# Patient Record
Sex: FEMALE | Race: WHITE | HISPANIC OR LATINO | URBAN - METROPOLITAN AREA
[De-identification: names, ages, dates, MRNs, and addresses within clinical notes are randomized per-mention and may not be internally consistent; named-entity substitution may affect disease eponyms.]

---

## 2017-10-25 ENCOUNTER — APPOINTMENT (OUTPATIENT)
Dept: RADIOLOGY | Facility: MEDICAL CENTER | Age: 52
DRG: 956 | End: 2017-10-25
Attending: EMERGENCY MEDICINE
Payer: COMMERCIAL

## 2017-10-25 ENCOUNTER — RESOLUTE PROFESSIONAL BILLING HOSPITAL PROF FEE (OUTPATIENT)
Dept: HOSPITALIST | Facility: MEDICAL CENTER | Age: 52
End: 2017-10-25

## 2017-10-25 ENCOUNTER — APPOINTMENT (OUTPATIENT)
Dept: RADIOLOGY | Facility: MEDICAL CENTER | Age: 52
DRG: 956 | End: 2017-10-25
Attending: PHYSICIAN ASSISTANT
Payer: COMMERCIAL

## 2017-10-25 ENCOUNTER — APPOINTMENT (OUTPATIENT)
Dept: RADIOLOGY | Facility: MEDICAL CENTER | Age: 52
DRG: 956 | End: 2017-10-25
Attending: SURGERY
Payer: COMMERCIAL

## 2017-10-25 ENCOUNTER — HOSPITAL ENCOUNTER (INPATIENT)
Facility: MEDICAL CENTER | Age: 52
LOS: 14 days | DRG: 956 | End: 2017-11-08
Attending: EMERGENCY MEDICINE | Admitting: SURGERY
Payer: COMMERCIAL

## 2017-10-25 DIAGNOSIS — S36.039A LACERATION OF SPLEEN, INITIAL ENCOUNTER: ICD-10-CM

## 2017-10-25 DIAGNOSIS — E11.8 TYPE 2 DIABETES MELLITUS WITH COMPLICATION, UNSPECIFIED LONG TERM INSULIN USE STATUS: ICD-10-CM

## 2017-10-25 DIAGNOSIS — S37.29XA TRAUMATIC RUPTURE OF BLADDER, INITIAL ENCOUNTER: ICD-10-CM

## 2017-10-25 DIAGNOSIS — S32.810A MULTIPLE CLOSED FRACTURES OF PELVIS WITH STABLE DISRUPTION OF PELVIC RING, INITIAL ENCOUNTER (HCC): ICD-10-CM

## 2017-10-25 DIAGNOSIS — S72.91XA CLOSED FRACTURE OF RIGHT FEMUR, UNSPECIFIED FRACTURE MORPHOLOGY, UNSPECIFIED PORTION OF FEMUR, INITIAL ENCOUNTER (HCC): ICD-10-CM

## 2017-10-25 DIAGNOSIS — S82.65XA CLOSED NONDISPLACED FRACTURE OF LATERAL MALLEOLUS OF LEFT FIBULA, INITIAL ENCOUNTER: ICD-10-CM

## 2017-10-25 PROBLEM — E11.9 TYPE 2 DIABETES MELLITUS (HCC): Status: ACTIVE | Noted: 2017-10-25

## 2017-10-25 PROBLEM — N32.89 BLADDER RUPTURE: Status: ACTIVE | Noted: 2017-10-25

## 2017-10-25 PROBLEM — S72.90XA FEMUR FRACTURE (HCC): Status: ACTIVE | Noted: 2017-10-25

## 2017-10-25 PROBLEM — S32.82XA MULTIPLE FRACTURES OF PELVIS (HCC): Status: ACTIVE | Noted: 2017-10-25

## 2017-10-25 PROBLEM — I10 HTN (HYPERTENSION): Status: ACTIVE | Noted: 2017-10-25

## 2017-10-25 PROBLEM — S32.9XXA PELVIC FRACTURE (HCC): Status: ACTIVE | Noted: 2017-10-25

## 2017-10-25 LAB
ABO GROUP BLD: NORMAL
ABO GROUP BLD: NORMAL
ALBUMIN SERPL BCP-MCNC: 3.6 G/DL (ref 3.2–4.9)
ALBUMIN/GLOB SERPL: 1.3 G/DL
ALP SERPL-CCNC: 64 U/L (ref 30–99)
ALT SERPL-CCNC: 20 U/L (ref 2–50)
ANION GAP SERPL CALC-SCNC: 9 MMOL/L (ref 0–11.9)
APTT PPP: 22.4 SEC (ref 24.7–36)
AST SERPL-CCNC: 37 U/L (ref 12–45)
BILIRUB SERPL-MCNC: 0.5 MG/DL (ref 0.1–1.5)
BLD GP AB SCN SERPL QL: NORMAL
BUN SERPL-MCNC: 15 MG/DL (ref 8–22)
CALCIUM SERPL-MCNC: 8.5 MG/DL (ref 8.5–10.5)
CHLORIDE SERPL-SCNC: 101 MMOL/L (ref 96–112)
CO2 SERPL-SCNC: 24 MMOL/L (ref 20–33)
CREAT SERPL-MCNC: 0.7 MG/DL (ref 0.5–1.4)
ERYTHROCYTE [DISTWIDTH] IN BLOOD BY AUTOMATED COUNT: 42 FL (ref 35.9–50)
ETHANOL BLD-MCNC: 0 G/DL
GFR SERPL CREATININE-BSD FRML MDRD: >60 ML/MIN/1.73 M 2
GLOBULIN SER CALC-MCNC: 2.7 G/DL (ref 1.9–3.5)
GLUCOSE BLD-MCNC: 201 MG/DL (ref 65–99)
GLUCOSE BLD-MCNC: 246 MG/DL (ref 65–99)
GLUCOSE SERPL-MCNC: 206 MG/DL (ref 65–99)
HCG SERPL QL: NEGATIVE
HCT VFR BLD AUTO: 38.5 % (ref 37–47)
HGB BLD-MCNC: 13 G/DL (ref 12–16)
HGB BLD-MCNC: 7.9 G/DL (ref 12–16)
HGB BLD-MCNC: 9.7 G/DL (ref 12–16)
INR PPP: 1.03 (ref 0.87–1.13)
MCH RBC QN AUTO: 30.8 PG (ref 27–33)
MCHC RBC AUTO-ENTMCNC: 33.8 G/DL (ref 33.6–35)
MCV RBC AUTO: 91.2 FL (ref 81.4–97.8)
PLATELET # BLD AUTO: 347 K/UL (ref 164–446)
PMV BLD AUTO: 8.8 FL (ref 9–12.9)
POTASSIUM SERPL-SCNC: 3.8 MMOL/L (ref 3.6–5.5)
PROT SERPL-MCNC: 6.3 G/DL (ref 6–8.2)
PROTHROMBIN TIME: 13.8 SEC (ref 12–14.6)
RBC # BLD AUTO: 4.22 M/UL (ref 4.2–5.4)
RH BLD: NORMAL
SODIUM SERPL-SCNC: 134 MMOL/L (ref 135–145)
WBC # BLD AUTO: 12.9 K/UL (ref 4.8–10.8)

## 2017-10-25 PROCEDURE — 700111 HCHG RX REV CODE 636 W/ 250 OVERRIDE (IP): Performed by: PHYSICIAN ASSISTANT

## 2017-10-25 PROCEDURE — 700102 HCHG RX REV CODE 250 W/ 637 OVERRIDE(OP): Performed by: SURGERY

## 2017-10-25 PROCEDURE — 90715 TDAP VACCINE 7 YRS/> IM: CPT | Performed by: EMERGENCY MEDICINE

## 2017-10-25 PROCEDURE — 36430 TRANSFUSION BLD/BLD COMPNT: CPT

## 2017-10-25 PROCEDURE — 700105 HCHG RX REV CODE 258: Performed by: SURGERY

## 2017-10-25 PROCEDURE — 86901 BLOOD TYPING SEROLOGIC RH(D): CPT

## 2017-10-25 PROCEDURE — 86923 COMPATIBILITY TEST ELECTRIC: CPT

## 2017-10-25 PROCEDURE — 770022 HCHG ROOM/CARE - ICU (200)

## 2017-10-25 PROCEDURE — 82962 GLUCOSE BLOOD TEST: CPT | Mod: 91

## 2017-10-25 PROCEDURE — P9016 RBC LEUKOCYTES REDUCED: HCPCS

## 2017-10-25 PROCEDURE — 700111 HCHG RX REV CODE 636 W/ 250 OVERRIDE (IP): Performed by: SURGERY

## 2017-10-25 PROCEDURE — 71010 DX-CHEST-LIMITED (1 VIEW): CPT

## 2017-10-25 PROCEDURE — G0390 TRAUMA RESPONS W/HOSP CRITI: HCPCS

## 2017-10-25 PROCEDURE — 73560 X-RAY EXAM OF KNEE 1 OR 2: CPT | Mod: RT

## 2017-10-25 PROCEDURE — 0TQB0ZZ REPAIR BLADDER, OPEN APPROACH: ICD-10-PCS | Performed by: SURGERY

## 2017-10-25 PROCEDURE — 80053 COMPREHEN METABOLIC PANEL: CPT

## 2017-10-25 PROCEDURE — 51702 INSERT TEMP BLADDER CATH: CPT

## 2017-10-25 PROCEDURE — 86850 RBC ANTIBODY SCREEN: CPT

## 2017-10-25 PROCEDURE — 160035 HCHG PACU - 1ST 60 MINS PHASE I: Performed by: SURGERY

## 2017-10-25 PROCEDURE — A9270 NON-COVERED ITEM OR SERVICE: HCPCS

## 2017-10-25 PROCEDURE — 72128 CT CHEST SPINE W/O DYE: CPT

## 2017-10-25 PROCEDURE — 160041 HCHG SURGERY MINUTES - EA ADDL 1 MIN LEVEL 4: Performed by: SURGERY

## 2017-10-25 PROCEDURE — 700117 HCHG RX CONTRAST REV CODE 255: Performed by: EMERGENCY MEDICINE

## 2017-10-25 PROCEDURE — 71260 CT THORAX DX C+: CPT

## 2017-10-25 PROCEDURE — 160029 HCHG SURGERY MINUTES - 1ST 30 MINS LEVEL 4: Performed by: SURGERY

## 2017-10-25 PROCEDURE — 700111 HCHG RX REV CODE 636 W/ 250 OVERRIDE (IP)

## 2017-10-25 PROCEDURE — 500122 HCHG BOVIE, BLADE: Performed by: SURGERY

## 2017-10-25 PROCEDURE — 72131 CT LUMBAR SPINE W/O DYE: CPT

## 2017-10-25 PROCEDURE — 85610 PROTHROMBIN TIME: CPT

## 2017-10-25 PROCEDURE — 501445 HCHG STAPLER, SKIN DISP: Performed by: SURGERY

## 2017-10-25 PROCEDURE — 73552 X-RAY EXAM OF FEMUR 2/>: CPT | Mod: RT

## 2017-10-25 PROCEDURE — 99291 CRITICAL CARE FIRST HOUR: CPT

## 2017-10-25 PROCEDURE — 86900 BLOOD TYPING SEROLOGIC ABO: CPT

## 2017-10-25 PROCEDURE — 85018 HEMOGLOBIN: CPT

## 2017-10-25 PROCEDURE — 72125 CT NECK SPINE W/O DYE: CPT

## 2017-10-25 PROCEDURE — 73590 X-RAY EXAM OF LOWER LEG: CPT | Mod: LT

## 2017-10-25 PROCEDURE — 160036 HCHG PACU - EA ADDL 30 MINS PHASE I: Performed by: SURGERY

## 2017-10-25 PROCEDURE — 700101 HCHG RX REV CODE 250

## 2017-10-25 PROCEDURE — 90471 IMMUNIZATION ADMIN: CPT

## 2017-10-25 PROCEDURE — 73610 X-RAY EXAM OF ANKLE: CPT | Mod: LT

## 2017-10-25 PROCEDURE — 96375 TX/PRO/DX INJ NEW DRUG ADDON: CPT

## 2017-10-25 PROCEDURE — 160002 HCHG RECOVERY MINUTES (STAT): Performed by: SURGERY

## 2017-10-25 PROCEDURE — 700102 HCHG RX REV CODE 250 W/ 637 OVERRIDE(OP)

## 2017-10-25 PROCEDURE — 303105 HCHG CATHETER EXTRA

## 2017-10-25 PROCEDURE — 160048 HCHG OR STATISTICAL LEVEL 1-5: Performed by: SURGERY

## 2017-10-25 PROCEDURE — 80307 DRUG TEST PRSMV CHEM ANLYZR: CPT

## 2017-10-25 PROCEDURE — 70450 CT HEAD/BRAIN W/O DYE: CPT

## 2017-10-25 PROCEDURE — 501838 HCHG SUTURE GENERAL: Performed by: SURGERY

## 2017-10-25 PROCEDURE — 85730 THROMBOPLASTIN TIME PARTIAL: CPT

## 2017-10-25 PROCEDURE — 85027 COMPLETE CBC AUTOMATED: CPT

## 2017-10-25 PROCEDURE — 160009 HCHG ANES TIME/MIN: Performed by: SURGERY

## 2017-10-25 PROCEDURE — 96365 THER/PROPH/DIAG IV INF INIT: CPT

## 2017-10-25 PROCEDURE — 72170 X-RAY EXAM OF PELVIS: CPT

## 2017-10-25 PROCEDURE — 700111 HCHG RX REV CODE 636 W/ 250 OVERRIDE (IP): Performed by: EMERGENCY MEDICINE

## 2017-10-25 PROCEDURE — 84703 CHORIONIC GONADOTROPIN ASSAY: CPT

## 2017-10-25 RX ORDER — SODIUM CHLORIDE, SODIUM LACTATE, POTASSIUM CHLORIDE, CALCIUM CHLORIDE 600; 310; 30; 20 MG/100ML; MG/100ML; MG/100ML; MG/100ML
2000 INJECTION, SOLUTION INTRAVENOUS ONCE
Status: COMPLETED | OUTPATIENT
Start: 2017-10-25 | End: 2017-10-25

## 2017-10-25 RX ORDER — SODIUM CHLORIDE, SODIUM LACTATE, POTASSIUM CHLORIDE, CALCIUM CHLORIDE 600; 310; 30; 20 MG/100ML; MG/100ML; MG/100ML; MG/100ML
INJECTION, SOLUTION INTRAVENOUS CONTINUOUS
Status: DISCONTINUED | OUTPATIENT
Start: 2017-10-25 | End: 2017-10-30

## 2017-10-25 RX ORDER — LEVOTHYROXINE SODIUM 0.07 MG/1
75 TABLET ORAL
COMMUNITY

## 2017-10-25 RX ORDER — LISINOPRIL 5 MG/1
5 TABLET ORAL DAILY
Status: ON HOLD | COMMUNITY
End: 2017-10-30

## 2017-10-25 RX ORDER — MAGNESIUM HYDROXIDE 1200 MG/15ML
LIQUID ORAL
Status: DISCONTINUED | OUTPATIENT
Start: 2017-10-25 | End: 2017-10-25 | Stop reason: HOSPADM

## 2017-10-25 RX ORDER — POLYETHYLENE GLYCOL 3350 17 G/17G
1 POWDER, FOR SOLUTION ORAL 2 TIMES DAILY
Status: DISCONTINUED | OUTPATIENT
Start: 2017-10-25 | End: 2017-11-08 | Stop reason: HOSPADM

## 2017-10-25 RX ORDER — DOCUSATE SODIUM 100 MG/1
100 CAPSULE, LIQUID FILLED ORAL 2 TIMES DAILY
Status: DISCONTINUED | OUTPATIENT
Start: 2017-10-25 | End: 2017-11-08 | Stop reason: HOSPADM

## 2017-10-25 RX ORDER — SIMVASTATIN 20 MG
20 TABLET ORAL DAILY
COMMUNITY

## 2017-10-25 RX ORDER — AMOXICILLIN 250 MG
1 CAPSULE ORAL
Status: DISCONTINUED | OUTPATIENT
Start: 2017-10-25 | End: 2017-11-08 | Stop reason: HOSPADM

## 2017-10-25 RX ORDER — CHLORHEXIDINE GLUCONATE ORAL RINSE 1.2 MG/ML
15 SOLUTION DENTAL EVERY 12 HOURS
Status: DISCONTINUED | OUTPATIENT
Start: 2017-10-25 | End: 2017-10-25

## 2017-10-25 RX ORDER — METFORMIN HYDROCHLORIDE 750 MG/1
1000 TABLET, EXTENDED RELEASE ORAL 2 TIMES DAILY
COMMUNITY

## 2017-10-25 RX ORDER — ENEMA 19; 7 G/133ML; G/133ML
1 ENEMA RECTAL
Status: DISCONTINUED | OUTPATIENT
Start: 2017-10-25 | End: 2017-11-08 | Stop reason: HOSPADM

## 2017-10-25 RX ORDER — ONDANSETRON 2 MG/ML
4 INJECTION INTRAMUSCULAR; INTRAVENOUS EVERY 4 HOURS PRN
Status: DISCONTINUED | OUTPATIENT
Start: 2017-10-25 | End: 2017-11-08 | Stop reason: HOSPADM

## 2017-10-25 RX ORDER — AMOXICILLIN 250 MG
1 CAPSULE ORAL NIGHTLY
Status: DISCONTINUED | OUTPATIENT
Start: 2017-10-25 | End: 2017-11-08 | Stop reason: HOSPADM

## 2017-10-25 RX ORDER — DEXTROSE MONOHYDRATE 25 G/50ML
25 INJECTION, SOLUTION INTRAVENOUS
Status: DISCONTINUED | OUTPATIENT
Start: 2017-10-25 | End: 2017-11-08 | Stop reason: HOSPADM

## 2017-10-25 RX ORDER — HYDROMORPHONE HYDROCHLORIDE 2 MG/ML
0.5 INJECTION, SOLUTION INTRAMUSCULAR; INTRAVENOUS; SUBCUTANEOUS
Status: COMPLETED | OUTPATIENT
Start: 2017-10-25 | End: 2017-10-25

## 2017-10-25 RX ORDER — BISACODYL 10 MG
10 SUPPOSITORY, RECTAL RECTAL
Status: DISCONTINUED | OUTPATIENT
Start: 2017-10-25 | End: 2017-11-08 | Stop reason: HOSPADM

## 2017-10-25 RX ADMIN — FENTANYL CITRATE 100 MCG: 50 INJECTION, SOLUTION INTRAMUSCULAR; INTRAVENOUS at 11:00

## 2017-10-25 RX ADMIN — SODIUM CHLORIDE, POTASSIUM CHLORIDE, SODIUM LACTATE AND CALCIUM CHLORIDE: 600; 310; 30; 20 INJECTION, SOLUTION INTRAVENOUS at 17:18

## 2017-10-25 RX ADMIN — SODIUM CHLORIDE, POTASSIUM CHLORIDE, SODIUM LACTATE AND CALCIUM CHLORIDE: 600; 310; 30; 20 INJECTION, SOLUTION INTRAVENOUS at 12:08

## 2017-10-25 RX ADMIN — CEFAZOLIN SODIUM 2 G: 1 INJECTION, SOLUTION INTRAVENOUS at 10:57

## 2017-10-25 RX ADMIN — CEFAZOLIN SODIUM: 1 INJECTION, SOLUTION INTRAVENOUS at 11:00

## 2017-10-25 RX ADMIN — Medication: at 12:13

## 2017-10-25 RX ADMIN — HYDROCODONE BITARTRATE AND ACETAMINOPHEN 30 ML: 2.5; 108 SOLUTION ORAL at 14:52

## 2017-10-25 RX ADMIN — INSULIN LISPRO 2 UNITS: 100 INJECTION, SOLUTION INTRAVENOUS; SUBCUTANEOUS at 23:56

## 2017-10-25 RX ADMIN — IOHEXOL 100 ML: 350 INJECTION, SOLUTION INTRAVENOUS at 11:42

## 2017-10-25 RX ADMIN — INSULIN LISPRO 5 UNITS: 100 INJECTION, SOLUTION INTRAVENOUS; SUBCUTANEOUS at 17:55

## 2017-10-25 RX ADMIN — FENTANYL CITRATE 50 MCG: 50 INJECTION, SOLUTION INTRAMUSCULAR; INTRAVENOUS at 15:01

## 2017-10-25 RX ADMIN — SODIUM CHLORIDE, POTASSIUM CHLORIDE, SODIUM LACTATE AND CALCIUM CHLORIDE 2000 ML: 600; 310; 30; 20 INJECTION, SOLUTION INTRAVENOUS at 17:19

## 2017-10-25 RX ADMIN — FENTANYL CITRATE 50 MCG: 50 INJECTION, SOLUTION INTRAMUSCULAR; INTRAVENOUS at 14:36

## 2017-10-25 RX ADMIN — HYDROMORPHONE HYDROCHLORIDE 0.5 MG: 2 INJECTION INTRAMUSCULAR; INTRAVENOUS; SUBCUTANEOUS at 11:49

## 2017-10-25 RX ADMIN — FAMOTIDINE 20 MG: 10 INJECTION, SOLUTION INTRAVENOUS at 23:00

## 2017-10-25 RX ADMIN — CLOSTRIDIUM TETANI TOXOID ANTIGEN (FORMALDEHYDE INACTIVATED), CORYNEBACTERIUM DIPHTHERIAE TOXOID ANTIGEN (FORMALDEHYDE INACTIVATED), BORDETELLA PERTUSSIS TOXOID ANTIGEN (GLUTARALDEHYDE INACTIVATED), BORDETELLA PERTUSSIS FILAMENTOUS HEMAGGLUTININ ANTIGEN (FORMALDEHYDE INACTIVATED), BORDETELLA PERTUSSIS PERTACTIN ANTIGEN, AND BORDETELLA PERTUSSIS FIMBRIAE 2/3 ANTIGEN 0.5 ML: 5; 2; 2.5; 5; 3; 5 INJECTION, SUSPENSION INTRAMUSCULAR at 11:03

## 2017-10-25 ASSESSMENT — PAIN SCALES - GENERAL
PAINLEVEL_OUTOF10: 8
PAINLEVEL_OUTOF10: ASSUMED PAIN PRESENT
PAINLEVEL_OUTOF10: 8
PAINLEVEL_OUTOF10: ASSUMED PAIN PRESENT
PAINLEVEL_OUTOF10: 9
PAINLEVEL_OUTOF10: ASSUMED PAIN PRESENT
PAINLEVEL_OUTOF10: 10
PAINLEVEL_OUTOF10: ASSUMED PAIN PRESENT
PAINLEVEL_OUTOF10: 10

## 2017-10-25 ASSESSMENT — COPD QUESTIONNAIRES
HAVE YOU SMOKED AT LEAST 100 CIGARETTES IN YOUR ENTIRE LIFE: NO/DON'T KNOW
DO YOU EVER COUGH UP ANY MUCUS OR PHLEGM?: NO/ONLY WITH OCCASIONAL COLDS OR INFECTIONS
COPD SCREENING SCORE: 1
DURING THE PAST 4 WEEKS HOW MUCH DID YOU FEEL SHORT OF BREATH: NONE/LITTLE OF THE TIME

## 2017-10-25 ASSESSMENT — PATIENT HEALTH QUESTIONNAIRE - PHQ9
SUM OF ALL RESPONSES TO PHQ9 QUESTIONS 1 AND 2: 0
1. LITTLE INTEREST OR PLEASURE IN DOING THINGS: NOT AT ALL
2. FEELING DOWN, DEPRESSED, IRRITABLE, OR HOPELESS: NOT AT ALL
SUM OF ALL RESPONSES TO PHQ QUESTIONS 1-9: 0

## 2017-10-25 ASSESSMENT — LIFESTYLE VARIABLES
ALCOHOL_USE: NO
EVER_SMOKED: NEVER
EVER_SMOKED: NEVER
DO YOU DRINK ALCOHOL: NO

## 2017-10-25 NOTE — CARE PLAN
Problem: Safety  Goal: Free from accidental injury  Pt call light and personal belongings within reach, monitor on and patient’s cardiac rhythm being monitored.   Bed in low position, non skid socks on, bed alarm on.  Pt currently able to communicate for assistance.   Pt near nursing station.  Sister at bedside.       Problem: Knowledge Deficit  Goal: Maintain or improve baseline circulation, motion and sensation  Discussed trauma with pt and her sister.   Assisted to ensure Iraqi speaking MD at bedside to assess patient and explain surgery.      Problem: Oxygenation/Respiratory Function  Goal: Patient will Achieve/Maintain Optimum Respiratory Rate/Effort  Oxygen saturation being monitored continuously.

## 2017-10-25 NOTE — PROGRESS NOTES
1142: Patient transported from ER [id number] to S109 with 2 ACLS RNs, chart with pt, vitals inputted (see flowsheets), orders reviewed, Dr. Tsang at bedside for exam.

## 2017-10-25 NOTE — ED NOTES
To blue 22 after CT scan. Assumed care of patient. Medicated for pain as ordered. Right ankle wound cleansed, dressed and splinted with orthoglass stirup. Family at bedside. Bedside report given to SICU and transported to unit by sybil.

## 2017-10-25 NOTE — OR SURGEON
Immediate Post OP Note    PreOp Diagnosis: possible bladder perforation, possible perforated viscous    PostOp Diagnosis: intraperitoneal bladder rupture    Procedure(s):  EXPLORATORY LAPAROTOMY Bladder Repair - Wound Class: Clean Contaminated    Surgeon(s):  Trino Tsang M.D.  Assist: Guy Lo D.O.    Anesthesiologist/Type of Anesthesia:  Anesthesiologist: Yakov Lancaster M.D./General    Surgical Staff:  Circulator: Janice Irizarry R.N.  Scrub Person: Anastasiya Rankin    Specimens:none    Estimated Blood Loss: 100 cc    Findings: intraperitoneal bladder rupture    Complications: none        10/25/2017 2:32 PM Trino Tsang
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
## 2017-10-25 NOTE — PROGRESS NOTES
1303: Pt transported to surgery on transport monitor with ACLS RN.     Consents signed by sister who is next of kin.     Report given to PreOP RN via phone prior to transport.
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
## 2017-10-25 NOTE — RESPIRATORY CARE
Responded to trauma yellow. Placed pt on 5 l n/c, sat 100%. No respiratory distress noted. Respiratory released by trauma surgeon.
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
## 2017-10-25 NOTE — PROGRESS NOTES
1537: pt returned to OR.     2 RNs able to complete skin assessment (unable at admit due to instability).     · Large scrape on LLE knee extending down into the LLE cast.   · Cast is very tight on LLE and mepelite used to protect skin.   · RLE has a long brace/splint. It is very tight on skin. Mepilex applied to groin where brace is strapped over hip.   · Small scab on right foot.

## 2017-10-26 ENCOUNTER — APPOINTMENT (OUTPATIENT)
Dept: RADIOLOGY | Facility: MEDICAL CENTER | Age: 52
DRG: 956 | End: 2017-10-26
Attending: ORTHOPAEDIC SURGERY
Payer: COMMERCIAL

## 2017-10-26 PROBLEM — S37.29XA TRAUMATIC RUPTURE OF BLADDER: Status: ACTIVE | Noted: 2017-10-25

## 2017-10-26 PROBLEM — Z53.09 CONTRAINDICATION TO DEEP VEIN THROMBOSIS (DVT) PROPHYLAXIS: Status: ACTIVE | Noted: 2017-10-26

## 2017-10-26 LAB
ALBUMIN SERPL BCP-MCNC: 2.1 G/DL (ref 3.2–4.9)
ALBUMIN/GLOB SERPL: 1.2 G/DL
ALP SERPL-CCNC: 29 U/L (ref 30–99)
ALT SERPL-CCNC: 18 U/L (ref 2–50)
ANION GAP SERPL CALC-SCNC: 7 MMOL/L (ref 0–11.9)
ANISOCYTOSIS BLD QL SMEAR: ABNORMAL
AST SERPL-CCNC: 40 U/L (ref 12–45)
BASE EXCESS BLDA CALC-SCNC: 1 MMOL/L (ref -4–3)
BASE EXCESS BLDA CALC-SCNC: 2 MMOL/L (ref -4–3)
BASOPHILS # BLD AUTO: 0 % (ref 0–1.8)
BASOPHILS # BLD: 0 K/UL (ref 0–0.12)
BILIRUB SERPL-MCNC: 0.8 MG/DL (ref 0.1–1.5)
BODY TEMPERATURE: ABNORMAL DEGREES
BODY TEMPERATURE: ABNORMAL DEGREES
BUN SERPL-MCNC: 11 MG/DL (ref 8–22)
CA-I BLD ISE-SCNC: 1.04 MMOL/L (ref 1.1–1.3)
CA-I BLD ISE-SCNC: 1.17 MMOL/L (ref 1.1–1.3)
CALCIUM SERPL-MCNC: 6.5 MG/DL (ref 8.5–10.5)
CHLORIDE SERPL-SCNC: 108 MMOL/L (ref 96–112)
CO2 BLDA-SCNC: 28 MMOL/L (ref 20–33)
CO2 BLDA-SCNC: 28 MMOL/L (ref 20–33)
CO2 SERPL-SCNC: 19 MMOL/L (ref 20–33)
CREAT SERPL-MCNC: 0.39 MG/DL (ref 0.5–1.4)
EOSINOPHIL # BLD AUTO: 0 K/UL (ref 0–0.51)
EOSINOPHIL NFR BLD: 0 % (ref 0–6.9)
ERYTHROCYTE [DISTWIDTH] IN BLOOD BY AUTOMATED COUNT: 45.5 FL (ref 35.9–50)
GFR SERPL CREATININE-BSD FRML MDRD: >60 ML/MIN/1.73 M 2
GLOBULIN SER CALC-MCNC: 1.8 G/DL (ref 1.9–3.5)
GLUCOSE BLD-MCNC: 144 MG/DL (ref 65–99)
GLUCOSE BLD-MCNC: 157 MG/DL (ref 65–99)
GLUCOSE BLD-MCNC: 172 MG/DL (ref 65–99)
GLUCOSE BLD-MCNC: 188 MG/DL (ref 65–99)
GLUCOSE BLD-MCNC: 189 MG/DL (ref 65–99)
GLUCOSE BLD-MCNC: 190 MG/DL (ref 65–99)
GLUCOSE SERPL-MCNC: 186 MG/DL (ref 65–99)
HCO3 BLDA-SCNC: 26.3 MMOL/L (ref 17–25)
HCO3 BLDA-SCNC: 26.4 MMOL/L (ref 17–25)
HCT VFR BLD AUTO: 26.6 % (ref 37–47)
HCT VFR BLD CALC: 20 % (ref 37–47)
HCT VFR BLD CALC: 25 % (ref 37–47)
HGB BLD-MCNC: 6.8 G/DL (ref 12–16)
HGB BLD-MCNC: 8.5 G/DL (ref 12–16)
HGB BLD-MCNC: 8.5 G/DL (ref 12–16)
HGB BLD-MCNC: 8.8 G/DL (ref 12–16)
LYMPHOCYTES # BLD AUTO: 0.75 K/UL (ref 1–4.8)
LYMPHOCYTES NFR BLD: 14.9 % (ref 22–41)
MAGNESIUM SERPL-MCNC: 1.3 MG/DL (ref 1.5–2.5)
MANUAL DIFF BLD: NORMAL
MCH RBC QN AUTO: 30.2 PG (ref 27–33)
MCHC RBC AUTO-ENTMCNC: 32.5 G/DL (ref 33.6–35)
MCV RBC AUTO: 93.1 FL (ref 81.4–97.8)
METAMYELOCYTES NFR BLD MANUAL: 1.7 %
MICROCYTES BLD QL SMEAR: ABNORMAL
MONOCYTES # BLD AUTO: 0.05 K/UL (ref 0–0.85)
MONOCYTES NFR BLD AUTO: 0.9 % (ref 0–13.4)
MORPHOLOGY BLD-IMP: NORMAL
NEUTROPHILS # BLD AUTO: 4.13 K/UL (ref 2–7.15)
NEUTROPHILS NFR BLD: 78.1 % (ref 44–72)
NEUTS BAND NFR BLD MANUAL: 4.4 % (ref 0–10)
NRBC # BLD AUTO: 0 K/UL
NRBC BLD AUTO-RTO: 0 /100 WBC
O2/TOTAL GAS SETTING VFR VENT: 0.92 %
O2/TOTAL GAS SETTING VFR VENT: 0.92 %
OVALOCYTES BLD QL SMEAR: NORMAL
PCO2 BLDA: 41.4 MMHG (ref 26–37)
PCO2 BLDA: 44 MMHG (ref 26–37)
PCO2 TEMP ADJ BLDA: 41.4 MMHG (ref 26–37)
PCO2 TEMP ADJ BLDA: 43.6 MMHG (ref 26–37)
PH BLDA: 7.39 [PH] (ref 7.4–7.5)
PH BLDA: 7.41 [PH] (ref 7.4–7.5)
PH TEMP ADJ BLDA: 7.39 [PH] (ref 7.4–7.5)
PH TEMP ADJ BLDA: 7.41 [PH] (ref 7.4–7.5)
PHOSPHATE SERPL-MCNC: 3 MG/DL (ref 2.5–4.5)
PLATELET # BLD AUTO: 110 K/UL (ref 164–446)
PLATELET BLD QL SMEAR: NORMAL
PMV BLD AUTO: 9.4 FL (ref 9–12.9)
PO2 BLDA: 209 MMHG (ref 64–87)
PO2 BLDA: 257 MMHG (ref 64–87)
PO2 TEMP ADJ BLDA: 209 MMHG (ref 64–87)
PO2 TEMP ADJ BLDA: 256 MMHG (ref 64–87)
POIKILOCYTOSIS BLD QL SMEAR: NORMAL
POTASSIUM BLD-SCNC: 3.6 MMOL/L (ref 3.6–5.5)
POTASSIUM BLD-SCNC: 4.2 MMOL/L (ref 3.6–5.5)
POTASSIUM SERPL-SCNC: 4 MMOL/L (ref 3.6–5.5)
PROT SERPL-MCNC: 3.9 G/DL (ref 6–8.2)
RBC # BLD AUTO: 2.88 M/UL (ref 4.2–5.4)
RBC BLD AUTO: PRESENT
SAO2 % BLDA: 100 % (ref 93–99)
SAO2 % BLDA: 100 % (ref 93–99)
SODIUM BLD-SCNC: 134 MMOL/L (ref 135–145)
SODIUM BLD-SCNC: 136 MMOL/L (ref 135–145)
SODIUM SERPL-SCNC: 134 MMOL/L (ref 135–145)
SPECIMEN DRAWN FROM PATIENT: ABNORMAL
SPECIMEN DRAWN FROM PATIENT: ABNORMAL
WBC # BLD AUTO: 5 K/UL (ref 4.8–10.8)

## 2017-10-26 PROCEDURE — 73552 X-RAY EXAM OF FEMUR 2/>: CPT | Mod: RT

## 2017-10-26 PROCEDURE — 160036 HCHG PACU - EA ADDL 30 MINS PHASE I: Performed by: ORTHOPAEDIC SURGERY

## 2017-10-26 PROCEDURE — 85027 COMPLETE CBC AUTOMATED: CPT

## 2017-10-26 PROCEDURE — C1713 ANCHOR/SCREW BN/BN,TIS/BN: HCPCS | Performed by: ORTHOPAEDIC SURGERY

## 2017-10-26 PROCEDURE — 84132 ASSAY OF SERUM POTASSIUM: CPT

## 2017-10-26 PROCEDURE — 86923 COMPATIBILITY TEST ELECTRIC: CPT

## 2017-10-26 PROCEDURE — 770022 HCHG ROOM/CARE - ICU (200)

## 2017-10-26 PROCEDURE — 501480 HCHG STOCKINETTE: Performed by: ORTHOPAEDIC SURGERY

## 2017-10-26 PROCEDURE — 0SS734Z REPOSITION RIGHT SACROILIAC JOINT WITH INTERNAL FIXATION DEVICE, PERCUTANEOUS APPROACH: ICD-10-PCS | Performed by: ORTHOPAEDIC SURGERY

## 2017-10-26 PROCEDURE — 160041 HCHG SURGERY MINUTES - EA ADDL 1 MIN LEVEL 4: Performed by: ORTHOPAEDIC SURGERY

## 2017-10-26 PROCEDURE — 502686 HCHG DRILL BIT, INTERTAN SHORT: Performed by: ORTHOPAEDIC SURGERY

## 2017-10-26 PROCEDURE — 73560 X-RAY EXAM OF KNEE 1 OR 2: CPT | Mod: RT

## 2017-10-26 PROCEDURE — 700111 HCHG RX REV CODE 636 W/ 250 OVERRIDE (IP)

## 2017-10-26 PROCEDURE — 501445 HCHG STAPLER, SKIN DISP: Performed by: ORTHOPAEDIC SURGERY

## 2017-10-26 PROCEDURE — 85014 HEMATOCRIT: CPT

## 2017-10-26 PROCEDURE — 501281: Performed by: ORTHOPAEDIC SURGERY

## 2017-10-26 PROCEDURE — A6402 STERILE GAUZE <= 16 SQ IN: HCPCS | Performed by: ORTHOPAEDIC SURGERY

## 2017-10-26 PROCEDURE — 500891 HCHG PACK, ORTHO MAJOR: Performed by: ORTHOPAEDIC SURGERY

## 2017-10-26 PROCEDURE — 500056 HCHG BANDAGE, ESMARK 6: Performed by: ORTHOPAEDIC SURGERY

## 2017-10-26 PROCEDURE — 502000 HCHG MISC OR IMPLANTS RC 0278: Performed by: ORTHOPAEDIC SURGERY

## 2017-10-26 PROCEDURE — 700111 HCHG RX REV CODE 636 W/ 250 OVERRIDE (IP): Performed by: SURGERY

## 2017-10-26 PROCEDURE — 500471: Performed by: ORTHOPAEDIC SURGERY

## 2017-10-26 PROCEDURE — A6222 GAUZE <=16 IN NO W/SAL W/O B: HCPCS | Performed by: ORTHOPAEDIC SURGERY

## 2017-10-26 PROCEDURE — 500054 HCHG BANDAGE, ELASTIC 6: Performed by: ORTHOPAEDIC SURGERY

## 2017-10-26 PROCEDURE — 36430 TRANSFUSION BLD/BLD COMPNT: CPT

## 2017-10-26 PROCEDURE — 0QSH04Z REPOSITION LEFT TIBIA WITH INTERNAL FIXATION DEVICE, OPEN APPROACH: ICD-10-PCS | Performed by: ORTHOPAEDIC SURGERY

## 2017-10-26 PROCEDURE — 82962 GLUCOSE BLOOD TEST: CPT

## 2017-10-26 PROCEDURE — 160035 HCHG PACU - 1ST 60 MINS PHASE I: Performed by: ORTHOPAEDIC SURGERY

## 2017-10-26 PROCEDURE — 84100 ASSAY OF PHOSPHORUS: CPT

## 2017-10-26 PROCEDURE — 110371 HCHG SHELL REV 272: Performed by: ORTHOPAEDIC SURGERY

## 2017-10-26 PROCEDURE — 0SS834Z REPOSITION LEFT SACROILIAC JOINT WITH INTERNAL FIXATION DEVICE, PERCUTANEOUS APPROACH: ICD-10-PCS | Performed by: ORTHOPAEDIC SURGERY

## 2017-10-26 PROCEDURE — 160002 HCHG RECOVERY MINUTES (STAT): Performed by: ORTHOPAEDIC SURGERY

## 2017-10-26 PROCEDURE — 85018 HEMOGLOBIN: CPT

## 2017-10-26 PROCEDURE — 82947 ASSAY GLUCOSE BLOOD QUANT: CPT | Mod: 91

## 2017-10-26 PROCEDURE — 500424 HCHG DRESSING, AIRSTRIP: Performed by: ORTHOPAEDIC SURGERY

## 2017-10-26 PROCEDURE — 72202 X-RAY EXAM SI JOINTS 3/> VWS: CPT

## 2017-10-26 PROCEDURE — 84295 ASSAY OF SERUM SODIUM: CPT | Mod: 91

## 2017-10-26 PROCEDURE — 700105 HCHG RX REV CODE 258: Performed by: SURGERY

## 2017-10-26 PROCEDURE — 0QS606Z REPOSITION RIGHT UPPER FEMUR WITH INTRAMEDULLARY INTERNAL FIXATION DEVICE, OPEN APPROACH: ICD-10-PCS | Performed by: ORTHOPAEDIC SURGERY

## 2017-10-26 PROCEDURE — C1769 GUIDE WIRE: HCPCS | Performed by: ORTHOPAEDIC SURGERY

## 2017-10-26 PROCEDURE — 80053 COMPREHEN METABOLIC PANEL: CPT

## 2017-10-26 PROCEDURE — 73600 X-RAY EXAM OF ANKLE: CPT | Mod: LT

## 2017-10-26 PROCEDURE — 500832: Performed by: ORTHOPAEDIC SURGERY

## 2017-10-26 PROCEDURE — P9016 RBC LEUKOCYTES REDUCED: HCPCS

## 2017-10-26 PROCEDURE — 503035 HCHG WASHER,OIC: Performed by: ORTHOPAEDIC SURGERY

## 2017-10-26 PROCEDURE — 82803 BLOOD GASES ANY COMBINATION: CPT

## 2017-10-26 PROCEDURE — 160009 HCHG ANES TIME/MIN: Performed by: ORTHOPAEDIC SURGERY

## 2017-10-26 PROCEDURE — 503036 HCHG GUIDE PIN,OIC: Performed by: ORTHOPAEDIC SURGERY

## 2017-10-26 PROCEDURE — 160029 HCHG SURGERY MINUTES - 1ST 30 MINS LEVEL 4: Performed by: ORTHOPAEDIC SURGERY

## 2017-10-26 PROCEDURE — 501838 HCHG SUTURE GENERAL: Performed by: ORTHOPAEDIC SURGERY

## 2017-10-26 PROCEDURE — 99233 SBSQ HOSP IP/OBS HIGH 50: CPT | Performed by: SURGERY

## 2017-10-26 PROCEDURE — A6223 GAUZE >16<=48 NO W/SAL W/O B: HCPCS | Performed by: ORTHOPAEDIC SURGERY

## 2017-10-26 PROCEDURE — 700101 HCHG RX REV CODE 250

## 2017-10-26 PROCEDURE — 83735 ASSAY OF MAGNESIUM: CPT

## 2017-10-26 PROCEDURE — 0QSB04Z REPOSITION RIGHT LOWER FEMUR WITH INTERNAL FIXATION DEVICE, OPEN APPROACH: ICD-10-PCS | Performed by: ORTHOPAEDIC SURGERY

## 2017-10-26 PROCEDURE — 160048 HCHG OR STATISTICAL LEVEL 1-5: Performed by: ORTHOPAEDIC SURGERY

## 2017-10-26 PROCEDURE — 82330 ASSAY OF CALCIUM: CPT | Mod: 91

## 2017-10-26 PROCEDURE — 700111 HCHG RX REV CODE 636 W/ 250 OVERRIDE (IP): Performed by: ORTHOPAEDIC SURGERY

## 2017-10-26 PROCEDURE — 502687 HCHG DRILL BIT, INTERTAN LONG: Performed by: ORTHOPAEDIC SURGERY

## 2017-10-26 PROCEDURE — 85007 BL SMEAR W/DIFF WBC COUNT: CPT

## 2017-10-26 DEVICE — SCREW CANNULATED 32MM THREAD 7.3MM X 95MM (3TX3=9): Type: IMPLANTABLE DEVICE | Site: ANKLE | Status: FUNCTIONAL

## 2017-10-26 DEVICE — IMPLANTABLE DEVICE: Type: IMPLANTABLE DEVICE | Site: ANKLE | Status: FUNCTIONAL

## 2017-10-26 DEVICE — SCREW CORTEX SELF TAPPING LOCKING LARGE FRAGMENT SYSTEM 4.5 X 30MM (2TX10=20): Type: IMPLANTABLE DEVICE | Site: ANKLE | Status: FUNCTIONAL

## 2017-10-26 DEVICE — SCREW CORTEX SELF TAPPING NON-LOCKING LARGE FRAGMENT SYSTEM 4.5 X 34MM (2TX10=20): Type: IMPLANTABLE DEVICE | Site: ANKLE | Status: FUNCTIONAL

## 2017-10-26 DEVICE — SCREW CANNULATED 32MM THREAD 7.3MM X 100MM (3TX3=9): Type: IMPLANTABLE DEVICE | Site: ANKLE | Status: FUNCTIONAL

## 2017-10-26 DEVICE — SCREW CANN 4.0X46 LONG OIC - (3TX2=6): Type: IMPLANTABLE DEVICE | Site: ANKLE | Status: FUNCTIONAL

## 2017-10-26 DEVICE — SCREW CANN 4.0X40 LONG OIC (5TX2=10): Type: IMPLANTABLE DEVICE | Site: ANKLE | Status: FUNCTIONAL

## 2017-10-26 DEVICE — SCREW CORTEX SELF TAPPING NON-LOCKING LARGE FRAGMENT SYSTEM 4.5 X 40MM (2TX10=20): Type: IMPLANTABLE DEVICE | Site: ANKLE | Status: FUNCTIONAL

## 2017-10-26 DEVICE — WASHER FOR 7.3MM CANNULATED SCREWS 13.0MM  (3TX18=54) (6EA/PK): Type: IMPLANTABLE DEVICE | Site: ANKLE | Status: FUNCTIONAL

## 2017-10-26 DEVICE — SCREW CORTEX SELF TAPPING LOCKING LARGE FRAGMENT SYSTEM 4.5 X 10MM (2TX4=8): Type: IMPLANTABLE DEVICE | Site: ANKLE | Status: FUNCTIONAL

## 2017-10-26 DEVICE — SCREW CORTEX SELF TAPPING LOCKING LARGE FRAGMENT SYSTEM 4.5 X 72MM (2TX4=8): Type: IMPLANTABLE DEVICE | Site: ANKLE | Status: FUNCTIONAL

## 2017-10-26 DEVICE — K-WIRE WITH TROCAR POINTS AND THREADED PINS 2.0MM X 228MM (2TX6=12) (6EA/BX): Type: IMPLANTABLE DEVICE | Site: ANKLE | Status: FUNCTIONAL

## 2017-10-26 RX ORDER — MAGNESIUM HYDROXIDE 1200 MG/15ML
LIQUID ORAL
Status: DISCONTINUED | OUTPATIENT
Start: 2017-10-26 | End: 2017-10-26 | Stop reason: HOSPADM

## 2017-10-26 RX ORDER — MEPERIDINE HYDROCHLORIDE 25 MG/ML
INJECTION INTRAMUSCULAR; INTRAVENOUS; SUBCUTANEOUS
Status: COMPLETED
Start: 2017-10-26 | End: 2017-10-26

## 2017-10-26 RX ORDER — CEFAZOLIN SODIUM 2 G/100ML
2 INJECTION, SOLUTION INTRAVENOUS EVERY 8 HOURS
Status: COMPLETED | OUTPATIENT
Start: 2017-10-26 | End: 2017-10-27

## 2017-10-26 RX ORDER — MAGNESIUM SULFATE HEPTAHYDRATE 40 MG/ML
4 INJECTION, SOLUTION INTRAVENOUS ONCE
Status: COMPLETED | OUTPATIENT
Start: 2017-10-26 | End: 2017-10-27

## 2017-10-26 RX ORDER — HYDROMORPHONE HYDROCHLORIDE 2 MG/ML
INJECTION, SOLUTION INTRAMUSCULAR; INTRAVENOUS; SUBCUTANEOUS
Status: COMPLETED
Start: 2017-10-26 | End: 2017-10-26

## 2017-10-26 RX ADMIN — SODIUM CHLORIDE, POTASSIUM CHLORIDE, SODIUM LACTATE AND CALCIUM CHLORIDE: 600; 310; 30; 20 INJECTION, SOLUTION INTRAVENOUS at 01:04

## 2017-10-26 RX ADMIN — CEFAZOLIN SODIUM 2 G: 2 INJECTION, SOLUTION INTRAVENOUS at 22:06

## 2017-10-26 RX ADMIN — FAMOTIDINE 20 MG: 10 INJECTION, SOLUTION INTRAVENOUS at 21:35

## 2017-10-26 RX ADMIN — MAGNESIUM SULFATE IN WATER 4 G: 40 INJECTION, SOLUTION INTRAVENOUS at 11:13

## 2017-10-26 RX ADMIN — INSULIN LISPRO 2 UNITS: 100 INJECTION, SOLUTION INTRAVENOUS; SUBCUTANEOUS at 06:00

## 2017-10-26 RX ADMIN — CALCIUM CHLORIDE 1 G: 100 INJECTION, SOLUTION INTRAVENOUS at 11:13

## 2017-10-26 RX ADMIN — HYDROMORPHONE HYDROCHLORIDE 0.5 MG: 2 INJECTION INTRAMUSCULAR; INTRAVENOUS; SUBCUTANEOUS at 20:12

## 2017-10-26 RX ADMIN — MEPERIDINE HYDROCHLORIDE 12.5 MG: 25 INJECTION INTRAMUSCULAR; INTRAVENOUS; SUBCUTANEOUS at 20:15

## 2017-10-26 RX ADMIN — FAMOTIDINE 20 MG: 10 INJECTION, SOLUTION INTRAVENOUS at 08:48

## 2017-10-26 RX ADMIN — FENTANYL CITRATE 0.5 MCG: 50 INJECTION, SOLUTION INTRAMUSCULAR; INTRAVENOUS at 19:45

## 2017-10-26 RX ADMIN — FENTANYL CITRATE 50 MCG: 50 INJECTION, SOLUTION INTRAMUSCULAR; INTRAVENOUS at 19:32

## 2017-10-26 RX ADMIN — HYDROMORPHONE HYDROCHLORIDE 0.5 MG: 2 INJECTION INTRAMUSCULAR; INTRAVENOUS; SUBCUTANEOUS at 20:05

## 2017-10-26 ASSESSMENT — PAIN SCALES - GENERAL
PAINLEVEL_OUTOF10: ASSUMED PAIN PRESENT
PAINLEVEL_OUTOF10: 6
PAINLEVEL_OUTOF10: 9
PAINLEVEL_OUTOF10: ASSUMED PAIN PRESENT
PAINLEVEL_OUTOF10: ASSUMED PAIN PRESENT
PAINLEVEL_OUTOF10: 6
PAINLEVEL_OUTOF10: ASSUMED PAIN PRESENT
PAINLEVEL_OUTOF10: 9
PAINLEVEL_OUTOF10: 3
PAINLEVEL_OUTOF10: ASSUMED PAIN PRESENT
PAINLEVEL_OUTOF10: 8
PAINLEVEL_OUTOF10: 9

## 2017-10-26 NOTE — PROGRESS NOTES
"Lyle splint was removed from pt's right lower extremity while traction was being held by ortho tech Rosas. +cms pre/post lyle removal. A 20\" knee immobilizer was placed on pt's right lower extremity. +cms pre/post immobilizer application. Rn to call Ortho tech with any questions or concerns.  "

## 2017-10-26 NOTE — CARE PLAN
Problem: Hemodynamic Status  Goal: Vital Signs and Fluid Balance Management    Intervention: Hemodynamic Monitoring  Pt with trending decrease in hemoglobin post op, MD aware. RN assessing hemoglobin level q 6hrs per MD order. MD notified of values as appropriate. Pt is borderline hypotensive and tachycardic. MD aware. Pt received 2L bolus post op per MD order. Will continue to monitor closely.
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
## 2017-10-26 NOTE — PROGRESS NOTES
Evette from Lab called with critical result of calcium of 6.5 at 0345. Critical lab result read back to Evette.   Corrected calcium value determined by RN to be 8.0.

## 2017-10-26 NOTE — PROGRESS NOTES
1 unit PRBCs verified by 2 RNs with read back. Blood hung and currently being transfused. Vitals to run [id number]. Consent signed by pt and is located in the pt chart.

## 2017-10-26 NOTE — PROGRESS NOTES
Page sent to Shayna OLVERA to confirm removal of Fortino splint and placement of knee immobilizer rather than Mata's traction. Per Shayna Matthew, start with knee immobilizer and if pain is too great to pt move to Mata's traction.

## 2017-10-26 NOTE — PROGRESS NOTES
Pt hypotensive (SBP 80s-90s) and tachycardic (115-120s). Hgb 9.7.   Discussed with Dr. Tsang and orders for 2L fluid and recheck of hgb in 4 hours.
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
## 2017-10-26 NOTE — PROGRESS NOTES
Received report on pt from night nurse. Assumed care of pt. Pt awake and fatigued. Pt A/O x4. Sister at bedside.

## 2017-10-26 NOTE — CARE PLAN
Problem: Knowledge Deficit  Goal: Knowledge of disease process/condition, treatment plan, diagnostic tests, and medications will improve    Intervention: Assess knowledge level of disease process/condition, treatment plan, diagnostic tests, and medications  Pt updated on POC. Demonstrated understanding by teach back. All questions answered.         Problem: Skin Integrity  Goal: Risk for impaired skin integrity will decrease    Intervention: Assess risk factors for impaired skin integrity and/or pressure ulcers  Pt at risk for skin breakdown r/t bedrest and injury. Pt turned minimum q 2 hr while on bedrest, skin assessed over bony prominences, skin protectant applied, pillows placed. Assessed for efficacy of interventions frequently.

## 2017-10-26 NOTE — PROGRESS NOTES
Pt currently wearing lyle splint on RLE   Paged Dr. Zarco / WADE Matthew to discuss.   Now that surgery is occurring tomorrow instead of today RN concerned for skin as the splint is digging into skin.

## 2017-10-26 NOTE — CARE PLAN
Problem: Infection  Goal: Will remain free from infection    Intervention: Assess signs and symptoms of infection  Pt at increased risk of infection due to recent surgical procedure and presence of invasive lines and devices. Pt assessed for increasing signs and symptoms of infection. Interventions in place.

## 2017-10-26 NOTE — PROGRESS NOTES
"Verbal consent obtained by orthopedic surgeon via language line . Pt asked questions, seemed anxious, but understood reasons for procedures and consented. Will notify pt's sister per pt request. Pt resting, A/O x4, c/o of upset stomach and \"pepcid not working\".  "

## 2017-10-26 NOTE — PROGRESS NOTES
Pt. Transported to [facility] Preop room 9. Sister at bedside with pt in preop room. Consents signed. Pt awake, A/o x4. Sister and pt both a little anxious.

## 2017-10-27 ENCOUNTER — APPOINTMENT (OUTPATIENT)
Dept: RADIOLOGY | Facility: MEDICAL CENTER | Age: 52
DRG: 956 | End: 2017-10-27
Attending: SURGERY
Payer: COMMERCIAL

## 2017-10-27 LAB
ALBUMIN SERPL BCP-MCNC: 2.2 G/DL (ref 3.2–4.9)
ANION GAP SERPL CALC-SCNC: 2 MMOL/L (ref 0–11.9)
ANISOCYTOSIS BLD QL SMEAR: ABNORMAL
BASOPHILS # BLD AUTO: 0 % (ref 0–1.8)
BASOPHILS # BLD: 0 K/UL (ref 0–0.12)
BUN SERPL-MCNC: 7 MG/DL (ref 8–22)
CALCIUM SERPL-MCNC: 6.9 MG/DL (ref 8.5–10.5)
CHLORIDE SERPL-SCNC: 102 MMOL/L (ref 96–112)
CO2 SERPL-SCNC: 27 MMOL/L (ref 20–33)
CREAT SERPL-MCNC: 0.37 MG/DL (ref 0.5–1.4)
EOSINOPHIL # BLD AUTO: 0 K/UL (ref 0–0.51)
EOSINOPHIL NFR BLD: 0 % (ref 0–6.9)
ERYTHROCYTE [DISTWIDTH] IN BLOOD BY AUTOMATED COUNT: 47.9 FL (ref 35.9–50)
GFR SERPL CREATININE-BSD FRML MDRD: >60 ML/MIN/1.73 M 2
GLUCOSE BLD-MCNC: 124 MG/DL (ref 65–99)
GLUCOSE BLD-MCNC: 147 MG/DL (ref 65–99)
GLUCOSE BLD-MCNC: 162 MG/DL (ref 65–99)
GLUCOSE BLD-MCNC: 205 MG/DL (ref 65–99)
GLUCOSE BLD-MCNC: 230 MG/DL (ref 65–99)
GLUCOSE BLD-MCNC: 232 MG/DL (ref 65–99)
GLUCOSE SERPL-MCNC: 208 MG/DL (ref 65–99)
HCT VFR BLD AUTO: 23.7 % (ref 37–47)
HGB BLD-MCNC: 7.7 G/DL (ref 12–16)
HGB BLD-MCNC: 8.2 G/DL (ref 12–16)
LYMPHOCYTES # BLD AUTO: 0.59 K/UL (ref 1–4.8)
LYMPHOCYTES NFR BLD: 10.4 % (ref 22–41)
MANUAL DIFF BLD: NORMAL
MCH RBC QN AUTO: 29.4 PG (ref 27–33)
MCHC RBC AUTO-ENTMCNC: 34.6 G/DL (ref 33.6–35)
MCV RBC AUTO: 84.9 FL (ref 81.4–97.8)
MICROCYTES BLD QL SMEAR: ABNORMAL
MONOCYTES # BLD AUTO: 0 K/UL (ref 0–0.85)
MONOCYTES NFR BLD AUTO: 0 % (ref 0–13.4)
MORPHOLOGY BLD-IMP: NORMAL
NEUTROPHILS # BLD AUTO: 5.11 K/UL (ref 2–7.15)
NEUTROPHILS NFR BLD: 80.9 % (ref 44–72)
NEUTS BAND NFR BLD MANUAL: 8.7 % (ref 0–10)
NRBC # BLD AUTO: 0.02 K/UL
NRBC BLD AUTO-RTO: 0.3 /100 WBC
PLATELET # BLD AUTO: 130 K/UL (ref 164–446)
PLATELET BLD QL SMEAR: NORMAL
PMV BLD AUTO: 9.3 FL (ref 9–12.9)
POTASSIUM SERPL-SCNC: 3.8 MMOL/L (ref 3.6–5.5)
RBC # BLD AUTO: 2.79 M/UL (ref 4.2–5.4)
RBC BLD AUTO: PRESENT
SODIUM SERPL-SCNC: 131 MMOL/L (ref 135–145)
WBC # BLD AUTO: 5.7 K/UL (ref 4.8–10.8)

## 2017-10-27 PROCEDURE — 3E0234Z INTRODUCTION OF SERUM, TOXOID AND VACCINE INTO MUSCLE, PERCUTANEOUS APPROACH: ICD-10-PCS | Performed by: SURGERY

## 2017-10-27 PROCEDURE — 85027 COMPLETE CBC AUTOMATED: CPT

## 2017-10-27 PROCEDURE — 80048 BASIC METABOLIC PNL TOTAL CA: CPT

## 2017-10-27 PROCEDURE — 85007 BL SMEAR W/DIFF WBC COUNT: CPT

## 2017-10-27 PROCEDURE — A9270 NON-COVERED ITEM OR SERVICE: HCPCS | Performed by: SURGERY

## 2017-10-27 PROCEDURE — 99233 SBSQ HOSP IP/OBS HIGH 50: CPT | Performed by: SURGERY

## 2017-10-27 PROCEDURE — 85018 HEMOGLOBIN: CPT

## 2017-10-27 PROCEDURE — G8978 MOBILITY CURRENT STATUS: HCPCS | Mod: CM

## 2017-10-27 PROCEDURE — G8979 MOBILITY GOAL STATUS: HCPCS | Mod: CJ

## 2017-10-27 PROCEDURE — 700102 HCHG RX REV CODE 250 W/ 637 OVERRIDE(OP): Performed by: SURGERY

## 2017-10-27 PROCEDURE — 97167 OT EVAL HIGH COMPLEX 60 MIN: CPT

## 2017-10-27 PROCEDURE — 770022 HCHG ROOM/CARE - ICU (200)

## 2017-10-27 PROCEDURE — 82962 GLUCOSE BLOOD TEST: CPT

## 2017-10-27 PROCEDURE — 700111 HCHG RX REV CODE 636 W/ 250 OVERRIDE (IP): Performed by: NURSE PRACTITIONER

## 2017-10-27 PROCEDURE — 700111 HCHG RX REV CODE 636 W/ 250 OVERRIDE (IP): Performed by: SURGERY

## 2017-10-27 PROCEDURE — 97162 PT EVAL MOD COMPLEX 30 MIN: CPT

## 2017-10-27 PROCEDURE — 700105 HCHG RX REV CODE 258: Performed by: SURGERY

## 2017-10-27 PROCEDURE — 82040 ASSAY OF SERUM ALBUMIN: CPT

## 2017-10-27 PROCEDURE — G8987 SELF CARE CURRENT STATUS: HCPCS | Mod: CL

## 2017-10-27 PROCEDURE — 700112 HCHG RX REV CODE 229: Performed by: SURGERY

## 2017-10-27 PROCEDURE — 700111 HCHG RX REV CODE 636 W/ 250 OVERRIDE (IP): Performed by: ORTHOPAEDIC SURGERY

## 2017-10-27 PROCEDURE — G8988 SELF CARE GOAL STATUS: HCPCS | Mod: CJ

## 2017-10-27 PROCEDURE — 71010 DX-CHEST-PORTABLE (1 VIEW): CPT

## 2017-10-27 RX ORDER — INSULIN GLARGINE 100 [IU]/ML
10 INJECTION, SOLUTION SUBCUTANEOUS EVERY EVENING
Status: DISCONTINUED | OUTPATIENT
Start: 2017-10-27 | End: 2017-10-30

## 2017-10-27 RX ORDER — OXYCODONE HYDROCHLORIDE 5 MG/1
5 TABLET ORAL
Status: DISCONTINUED | OUTPATIENT
Start: 2017-10-27 | End: 2017-11-08 | Stop reason: HOSPADM

## 2017-10-27 RX ORDER — OXYCODONE HYDROCHLORIDE 10 MG/1
10 TABLET ORAL
Status: DISCONTINUED | OUTPATIENT
Start: 2017-10-27 | End: 2017-11-08 | Stop reason: HOSPADM

## 2017-10-27 RX ORDER — DIPHENHYDRAMINE HYDROCHLORIDE 50 MG/ML
25 INJECTION INTRAMUSCULAR; INTRAVENOUS ONCE
Status: COMPLETED | OUTPATIENT
Start: 2017-10-27 | End: 2017-10-27

## 2017-10-27 RX ADMIN — SODIUM CHLORIDE, POTASSIUM CHLORIDE, SODIUM LACTATE AND CALCIUM CHLORIDE: 600; 310; 30; 20 INJECTION, SOLUTION INTRAVENOUS at 10:36

## 2017-10-27 RX ADMIN — FAMOTIDINE 20 MG: 10 INJECTION, SOLUTION INTRAVENOUS at 08:23

## 2017-10-27 RX ADMIN — ENOXAPARIN SODIUM 30 MG: 100 INJECTION SUBCUTANEOUS at 20:36

## 2017-10-27 RX ADMIN — DOCUSATE SODIUM 100 MG: 100 CAPSULE ORAL at 20:37

## 2017-10-27 RX ADMIN — HYDROMORPHONE HYDROCHLORIDE 1 MG: 1 INJECTION, SOLUTION INTRAMUSCULAR; INTRAVENOUS; SUBCUTANEOUS at 17:13

## 2017-10-27 RX ADMIN — Medication: at 04:51

## 2017-10-27 RX ADMIN — OXYCODONE HYDROCHLORIDE 10 MG: 10 TABLET ORAL at 11:44

## 2017-10-27 RX ADMIN — POLYETHYLENE GLYCOL 3350 1 PACKET: 17 POWDER, FOR SOLUTION ORAL at 20:36

## 2017-10-27 RX ADMIN — STANDARDIZED SENNA CONCENTRATE AND DOCUSATE SODIUM 1 TABLET: 8.6; 5 TABLET, FILM COATED ORAL at 21:00

## 2017-10-27 RX ADMIN — CEFAZOLIN SODIUM 2 G: 2 INJECTION, SOLUTION INTRAVENOUS at 04:56

## 2017-10-27 RX ADMIN — DIPHENHYDRAMINE HYDROCHLORIDE 25 MG: 50 INJECTION, SOLUTION INTRAMUSCULAR; INTRAVENOUS at 14:06

## 2017-10-27 RX ADMIN — HYDROMORPHONE HYDROCHLORIDE 1 MG: 1 INJECTION, SOLUTION INTRAMUSCULAR; INTRAVENOUS; SUBCUTANEOUS at 09:55

## 2017-10-27 RX ADMIN — SODIUM CHLORIDE, POTASSIUM CHLORIDE, SODIUM LACTATE AND CALCIUM CHLORIDE: 600; 310; 30; 20 INJECTION, SOLUTION INTRAVENOUS at 02:42

## 2017-10-27 RX ADMIN — INSULIN GLARGINE 10 UNITS: 100 INJECTION, SOLUTION SUBCUTANEOUS at 20:37

## 2017-10-27 RX ADMIN — OXYCODONE HYDROCHLORIDE 10 MG: 10 TABLET ORAL at 20:38

## 2017-10-27 ASSESSMENT — COGNITIVE AND FUNCTIONAL STATUS - GENERAL
EATING MEALS: A LITTLE
MOVING FROM LYING ON BACK TO SITTING ON SIDE OF FLAT BED: UNABLE
TOILETING: TOTAL
WALKING IN HOSPITAL ROOM: TOTAL
MOVING TO AND FROM BED TO CHAIR: UNABLE
PERSONAL GROOMING: A LITTLE
DAILY ACTIVITIY SCORE: 11
CLIMB 3 TO 5 STEPS WITH RAILING: TOTAL
DRESSING REGULAR LOWER BODY CLOTHING: TOTAL
STANDING UP FROM CHAIR USING ARMS: TOTAL
TURNING FROM BACK TO SIDE WHILE IN FLAT BAD: UNABLE
MOBILITY SCORE: 6
HELP NEEDED FOR BATHING: TOTAL
DRESSING REGULAR UPPER BODY CLOTHING: A LOT
SUGGESTED CMS G CODE MODIFIER DAILY ACTIVITY: CL
SUGGESTED CMS G CODE MODIFIER MOBILITY: CN

## 2017-10-27 ASSESSMENT — GAIT ASSESSMENTS: GAIT LEVEL OF ASSIST: UNABLE TO PARTICIPATE

## 2017-10-27 ASSESSMENT — PAIN SCALES - GENERAL
PAINLEVEL_OUTOF10: 7
PAINLEVEL_OUTOF10: 6
PAINLEVEL_OUTOF10: 5
PAINLEVEL_OUTOF10: 5
PAINLEVEL_OUTOF10: 4
PAINLEVEL_OUTOF10: 8
PAINLEVEL_OUTOF10: 7
PAINLEVEL_OUTOF10: 4
PAINLEVEL_OUTOF10: 7
PAINLEVEL_OUTOF10: 8
PAINLEVEL_OUTOF10: 4
PAINLEVEL_OUTOF10: 9
PAINLEVEL_OUTOF10: 8

## 2017-10-27 ASSESSMENT — ACTIVITIES OF DAILY LIVING (ADL): TOILETING: INDEPENDENT

## 2017-10-27 NOTE — PROGRESS NOTES
Kristi from Lab called with critical result of Calcium of 6.9 at 0500. Critical lab result read back to Kristi by Getachew LAWTON.   Corrected calcium calculated by RN to be 8.3.

## 2017-10-27 NOTE — CARE PLAN
Problem: Infection  Goal: Will remain free from infection    Intervention: Assess signs and symptoms of infection  Pt at increased risk for infection due to recent surgical procedures and presence of invasive lines and devices. Pt assessed regularly for increasing signs and symptoms of infection. Interventions in place. Pt receiving IV antibiotics per MD order.

## 2017-10-27 NOTE — OR NURSING
"Pt has signs of pain; asked her sister to come into the pacu to help translate. Pt states pain is 'more than a 10\". Pain meds given with good results.  "

## 2017-10-27 NOTE — PROGRESS NOTES
Pt transferred from PACU on tele monitor with OR nurse, Marleny. Pt on 6L oxy mask, titrated up to 8L. Pt placed on in unit tele monitor. Drips restarted. Pt repositioned and resting comfortably with sister at bedside.

## 2017-10-27 NOTE — PROGRESS NOTES
2 RN skin check following OR. Mepilex in place on sacrum, with abrasions noted prior and gauze covering bilateral screw sites. Dressing in place on left lower leg and immobilizer in place on right leg.

## 2017-10-27 NOTE — CARE PLAN
Problem: Safety  Goal: Will remain free from injury    Intervention: Provide assistance with mobility  Call light within reach, treaded socks in place, bed in lowest position and locked.  Hourly rounding in progress.       Problem: Pain Management  Goal: Pain level will decrease to patient's comfort goal  PCA in place
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
## 2017-10-27 NOTE — CARE PLAN
Problem: Skin Integrity  Goal: Risk for impaired skin integrity will decrease    Intervention: Assess risk factors for impaired skin integrity and/or pressure ulcers  Pt at increased risk for loss of skin integrity due to recent surgical procedures with wound sites related to injury. Pt also with multiple medical devices and lines. Pts skin assessed by RN. Pt turned q2hrs with pillows in position for offloading of bony prominences.
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
## 2017-10-27 NOTE — THERAPY
"Physical Therapy Evaluation completed.   Bed Mobility:  Supine to Sit: Maximal Assist (2 person)  Transfers: Sit to Stand: Unable to Participate  Gait: Level Of Assist: Unable to Participate with No Equipment Needed       Plan of Care: Will benefit from Physical Therapy 3 times per week  Discharge Recommendations: Equipment: Will Continue to Assess for Equipment Needs. Post-acute therapy Discharge to a transitional care facility for continued skilled therapy services.    See \"Rehab Therapy-Acute\" Patient Summary Report for complete documentation.     "

## 2017-10-27 NOTE — PROGRESS NOTES
S:  Seen and examined.  POD #1 s/p R femur ORIF, L medial malleolus ORIF and I&D, B posterior pelvic ring injury percutaneous fixation.  Doing well this morning.  No new complaints, pain controlled.    O: Blood pressure 103/62, pulse (!) 119, temperature 37.2 °C (99 °F), resp. rate (!) 22, weight 75.3 kg (166 lb 0.1 oz), SpO2 95 %, not currently breastfeeding..    Intake/Output Summary (Last 24 hours) at 10/27/17 1416  Last data filed at 10/27/17 1200   Gross per 24 hour   Intake          6424.15 ml   Output             3050 ml   Net          3374.15 ml   .    Bilateral lower extremities examined.  Compartments soft, distal light touch sensation intact, firing EHL/TA/GS/P, cannot dorsiflex with much force on right as foot feels heavy, but dorsiflexion intact.  Sensation intact about foot bilaterally.  Toes warm, well-perfused.  Wound dressings all intact.    Recent Labs      10/25/17   1055   10/26/17   0220  10/26/17   0832  10/27/17   0405   WBC  12.9*   --   5.0   --   5.7   RBC  4.22   --   2.88*   --   2.79*   HEMOGLOBIN  13.0   < >  8.8*  8.5*  8.2*   HEMATOCRIT  38.5   --   26.6*   --   23.7*   MCV  91.2   --   93.1   --   84.9   MCH  30.8   --   30.2   --   29.4   MCHC  33.8   --   32.5*   --   34.6   RDW  42.0   --   45.5   --   47.9   PLATELETCT  347   --   110*   --   130*   MPV  8.8*   --   9.4   --   9.3    < > = values in this interval not displayed.       A/P:    #1 Bilateral posterior pelvic ring injuries s/p SI screw placement  #2 Right subtrochanteric femur fracture s/p IM nail  #3 Right distal femur fx s/p ORIF  #4 Left open medial malleolus fracture s/p I&D, ORIF    Antibiotics: Ancef x24 hrs for Grade 1 open fracture  Activity: TTWB BLE.  PT today or as able.  Diet: General  DVT: Mechanical (SCDs) + Pharmacologic (Lovenox, OK to begin from ortho perspective when OK per trauma)  Dispo: D/C planning

## 2017-10-27 NOTE — PROGRESS NOTES
Trauma/Surgical Progress Note      Interval Events:  Admitted overnight  To OR with Ortho to address multiple pelvic fractures  Increased insulin sliding scale  Replaced calcium and magnesium    Polytrauma with multiple musculoskeletal injures and fractures requiring parenteral controlled substances    I independently reviewed pertinent clinical lab tests since admission and ordered additional follow up clinical lab tests.  I independently reviewed pertinent radiographic images and the radiologist's reports since admission and ordered additional follow up radiographic studies.  I reviewed the details of the available patient records and documentation by consulting physicians in [facility] up to today, summated the information, and utilized the information as warranted in today's medical decision making for this patient.  I personally evaluated the patient condition at bedside and discussed the daily plan(s) with available nursing staff, dieticians, social workers, pharmacists on rounds.    Hemodynamics:  Blood pressure 103/62, pulse 91, temperature 37.4 °C (99.3 °F), resp. rate 14, weight 72.1 kg (158 lb 15.2 oz), SpO2 96 %, not currently breastfeeding.     Respiratory:    Respiration: 14, Pulse Oximetry: 96 %     Work Of Breathing / Effort: Mild     Fluids:    Intake/Output Summary (Last 24 hours) at 10/26/17 1734  Last data filed at 10/26/17 1518   Gross per 24 hour   Intake          6174.68 ml   Output             1490 ml   Net          4684.68 ml     Admit Weight: 68.1 kg (150 lb 2.1 oz)  Current Weight: 72.1 kg (158 lb 15.2 oz)    Physical Exam   Constitutional: Vital signs are normal. She appears well-developed. She is sleeping. She does not appear ill. No distress. Nasal cannula in place.   HENT:   Head: Normocephalic and atraumatic.   Right Ear: Hearing normal.   Left Ear: Hearing normal.   Nose: Nose normal.   Eyes: Conjunctivae and EOM are normal. Pupils are equal, round, and reactive to light.   Neck: Trachea  normal.   Cardiovascular: Normal rate, regular rhythm, intact distal pulses and normal pulses.    Pulmonary/Chest: Breath sounds normal. Accessory muscle usage present. No stridor. She is in respiratory distress.   Abdominal: Soft. Normal appearance. There is tenderness in the suprapubic area.   Genitourinary:   Genitourinary Comments: Parr in place draining clear yellow urine   Neurological: GCS eye subscore is 4. GCS verbal subscore is 5. GCS motor subscore is 6.   Skin: Skin is warm and dry.   Psychiatric: She has a normal mood and affect. Her speech is normal and behavior is normal.   Nursing note and vitals reviewed.      Medical Decision Making/Problem List:    Active Hospital Problems    Diagnosis   • Femur fracture (CMS-HCC) [S72.90XA]     Priority: High      Comminuted fractures of the proximal and distal femur, right .  Management PENDING  Weight bearing status - NWB.  Alex Zarco MD. Orthopedic Surgery.       • Multiple fractures of pelvis (CMS-HCC) [S32.810A]     Priority: High     Bilateral superior and inferior pubic rami fractures. Displaced on the left.   There is likely fracture of at least the right sacral alar.   Possible fracture of the right posterior acetabulum.  Management PENDING  Weight bearing status - NWB.  Alex Zarco MD. Orthopedic Surgery.       • Traumatic rupture of bladder [S37.29XA]     Priority: High     Intra-peritoneal  10/25 - Layered closure  Parr to remain in place for 10 days  Cystogram prior to removal     • Contraindication to deep vein thrombosis (DVT) prophylaxis [Z53.09]     Priority: Medium     Systemic anticoagulation contraindicated secondary to elevated bleeding risk.       • Closed nondisplaced fracture of lateral malleolus of left fibula [S82.65XA]     Priority: Medium     Minimally displaced  Small adjacent puncture wound.  Management PENDING  Weight bearing status - NWB.  Alex Zarco MD. Orthopedic Surgery.       • Type 2 diabetes mellitus (CMS-HCC)  [E11.9]     Priority: Medium     High ISS and serial BS ordered     • Spleen laceration [S36.039A]     Priority: Medium     Small grade 1 laceration, lateral inferior aspect of the spleen.  Trend hemoglobin     • HTN (hypertension) [I10]     Priority: Low     Home medication verification per pharmacy.        Core Measures & Quality Metrics:  Radiology images reviewed, Labs reviewed and Medications reviewed  Parr catheter: Critically Ill - Requiring Accurate Measurement of Urinary Output and Urologic Surgery or Other Surgery on Contiguous Structures of the Genitourinary Tract or Studies      DVT Prophylaxis: Contraindicated - High bleeding risk  DVT prophylaxis - mechanical: SCDs  Ulcer prophylaxis: Not indicated        YUNI Score    Aggregated time spent evaluating, providing care, and managing this patient exclusive of procedures: 40 minutes    John Vazquez MD    DATE OF SERVICE: 10/26/2017

## 2017-10-27 NOTE — THERAPY
"Occupational Therapy Evaluation completed.   Functional Status:  Pt s/p femur fx, pelvis fx s/p ORIF screw fixation, BLE TTWB, performed bed mobility with max a x2, P+ sitting balance progressed to F- towards end of the session with support through BUEs. Pt limited by significant pain, fear with movement, impaired balance, strength and endurance deficits which impacts pt's ability to perform ADLs negatively. Pt would benefit from acute skilled services while in house and post acute skilled services recommended.   Plan of Care: Will benefit from Occupational Therapy 3 times per week  Discharge Recommendations:  Equipment: Will Continue to Assess for Equipment Needs. Post-acute therapy Discharge to a transitional care facility for continued skilled therapy services.    See \"Rehab Therapy-Acute\" Patient Summary Report for complete documentation.    "

## 2017-10-28 PROBLEM — S72.91XA CLOSED FRACTURE OF RIGHT FEMUR (HCC): Status: ACTIVE | Noted: 2017-10-25

## 2017-10-28 PROBLEM — E83.42 HYPOMAGNESEMIA: Status: ACTIVE | Noted: 2017-10-28

## 2017-10-28 PROBLEM — D62 ANEMIA ASSOCIATED WITH ACUTE BLOOD LOSS: Status: ACTIVE | Noted: 2017-10-28

## 2017-10-28 LAB
ANION GAP SERPL CALC-SCNC: 4 MMOL/L (ref 0–11.9)
ANISOCYTOSIS BLD QL SMEAR: ABNORMAL
BARCODED ABORH UBTYP: 5100
BARCODED ABORH UBTYP: 9500
BARCODED PRD CODE UBPRD: NORMAL
BARCODED UNIT NUM UBUNT: NORMAL
BASOPHILS # BLD AUTO: 0 % (ref 0–1.8)
BASOPHILS # BLD: 0 K/UL (ref 0–0.12)
BUN SERPL-MCNC: 5 MG/DL (ref 8–22)
CALCIUM SERPL-MCNC: 7 MG/DL (ref 8.5–10.5)
CHLORIDE SERPL-SCNC: 101 MMOL/L (ref 96–112)
CO2 SERPL-SCNC: 30 MMOL/L (ref 20–33)
COMPONENT R 8504R: NORMAL
CREAT SERPL-MCNC: 0.3 MG/DL (ref 0.5–1.4)
EOSINOPHIL # BLD AUTO: 0 K/UL (ref 0–0.51)
EOSINOPHIL NFR BLD: 0 % (ref 0–6.9)
ERYTHROCYTE [DISTWIDTH] IN BLOOD BY AUTOMATED COUNT: 46.8 FL (ref 35.9–50)
ERYTHROCYTE [DISTWIDTH] IN BLOOD BY AUTOMATED COUNT: 50 FL (ref 35.9–50)
GFR SERPL CREATININE-BSD FRML MDRD: >60 ML/MIN/1.73 M 2
GLUCOSE BLD-MCNC: 128 MG/DL (ref 65–99)
GLUCOSE BLD-MCNC: 138 MG/DL (ref 65–99)
GLUCOSE BLD-MCNC: 152 MG/DL (ref 65–99)
GLUCOSE SERPL-MCNC: 155 MG/DL (ref 65–99)
HCT VFR BLD AUTO: 19.6 % (ref 37–47)
HCT VFR BLD AUTO: 23.2 % (ref 37–47)
HGB BLD-MCNC: 6.5 G/DL (ref 12–16)
HGB BLD-MCNC: 7.9 G/DL (ref 12–16)
HGB RETIC QN AUTO: 33.6 PG/CELL (ref 29–35)
IMM RETICS NFR: 32.3 % (ref 9.3–17.4)
IRON SATN MFR SERPL: 6 % (ref 15–55)
IRON SERPL-MCNC: 11 UG/DL (ref 40–170)
LYMPHOCYTES # BLD AUTO: 0.56 K/UL (ref 1–4.8)
LYMPHOCYTES NFR BLD: 9.7 % (ref 22–41)
MAGNESIUM SERPL-MCNC: 1.8 MG/DL (ref 1.5–2.5)
MANUAL DIFF BLD: NORMAL
MCH RBC QN AUTO: 29.1 PG (ref 27–33)
MCH RBC QN AUTO: 29.3 PG (ref 27–33)
MCHC RBC AUTO-ENTMCNC: 33.2 G/DL (ref 33.6–35)
MCHC RBC AUTO-ENTMCNC: 34.1 G/DL (ref 33.6–35)
MCV RBC AUTO: 85.9 FL (ref 81.4–97.8)
MCV RBC AUTO: 87.9 FL (ref 81.4–97.8)
MICROCYTES BLD QL SMEAR: ABNORMAL
MONOCYTES # BLD AUTO: 0.2 K/UL (ref 0–0.85)
MONOCYTES NFR BLD AUTO: 3.5 % (ref 0–13.4)
MORPHOLOGY BLD-IMP: NORMAL
NEUTROPHILS # BLD AUTO: 5.03 K/UL (ref 2–7.15)
NEUTROPHILS NFR BLD: 85.9 % (ref 44–72)
NEUTS BAND NFR BLD MANUAL: 0.9 % (ref 0–10)
NRBC # BLD AUTO: 0 K/UL
NRBC BLD AUTO-RTO: 0 /100 WBC
PLATELET # BLD AUTO: 112 K/UL (ref 164–446)
PLATELET # BLD AUTO: 117 K/UL (ref 164–446)
PLATELET BLD QL SMEAR: NORMAL
PMV BLD AUTO: 8.9 FL (ref 9–12.9)
PMV BLD AUTO: 9.1 FL (ref 9–12.9)
POTASSIUM SERPL-SCNC: 3.7 MMOL/L (ref 3.6–5.5)
PRODUCT TYPE UPROD: NORMAL
RBC # BLD AUTO: 2.23 M/UL (ref 4.2–5.4)
RBC # BLD AUTO: 2.7 M/UL (ref 4.2–5.4)
RBC BLD AUTO: PRESENT
RETICS # AUTO: 0.08 M/UL (ref 0.04–0.06)
RETICS/RBC NFR: 2.9 % (ref 0.8–2.1)
SODIUM SERPL-SCNC: 135 MMOL/L (ref 135–145)
TIBC SERPL-MCNC: 182 UG/DL (ref 250–450)
UNIT STATUS USTAT: NORMAL
WBC # BLD AUTO: 5.8 K/UL (ref 4.8–10.8)
WBC # BLD AUTO: 6.2 K/UL (ref 4.8–10.8)

## 2017-10-28 PROCEDURE — 36430 TRANSFUSION BLD/BLD COMPNT: CPT

## 2017-10-28 PROCEDURE — A9270 NON-COVERED ITEM OR SERVICE: HCPCS | Performed by: SURGERY

## 2017-10-28 PROCEDURE — 700102 HCHG RX REV CODE 250 W/ 637 OVERRIDE(OP)

## 2017-10-28 PROCEDURE — 700111 HCHG RX REV CODE 636 W/ 250 OVERRIDE (IP)

## 2017-10-28 PROCEDURE — 700111 HCHG RX REV CODE 636 W/ 250 OVERRIDE (IP): Performed by: SURGERY

## 2017-10-28 PROCEDURE — 83550 IRON BINDING TEST: CPT

## 2017-10-28 PROCEDURE — 82962 GLUCOSE BLOOD TEST: CPT | Mod: 91

## 2017-10-28 PROCEDURE — 83540 ASSAY OF IRON: CPT

## 2017-10-28 PROCEDURE — 30233N1 TRANSFUSION OF NONAUTOLOGOUS RED BLOOD CELLS INTO PERIPHERAL VEIN, PERCUTANEOUS APPROACH: ICD-10-PCS | Performed by: SURGERY

## 2017-10-28 PROCEDURE — P9016 RBC LEUKOCYTES REDUCED: HCPCS

## 2017-10-28 PROCEDURE — 85007 BL SMEAR W/DIFF WBC COUNT: CPT

## 2017-10-28 PROCEDURE — 80048 BASIC METABOLIC PNL TOTAL CA: CPT

## 2017-10-28 PROCEDURE — 700105 HCHG RX REV CODE 258

## 2017-10-28 PROCEDURE — 700112 HCHG RX REV CODE 229: Performed by: SURGERY

## 2017-10-28 PROCEDURE — 85027 COMPLETE CBC AUTOMATED: CPT

## 2017-10-28 PROCEDURE — 700102 HCHG RX REV CODE 250 W/ 637 OVERRIDE(OP): Performed by: SURGERY

## 2017-10-28 PROCEDURE — 85046 RETICYTE/HGB CONCENTRATE: CPT

## 2017-10-28 PROCEDURE — 700105 HCHG RX REV CODE 258: Performed by: SURGERY

## 2017-10-28 PROCEDURE — A9270 NON-COVERED ITEM OR SERVICE: HCPCS

## 2017-10-28 PROCEDURE — 700102 HCHG RX REV CODE 250 W/ 637 OVERRIDE(OP): Performed by: PHYSICIAN ASSISTANT

## 2017-10-28 PROCEDURE — 770022 HCHG ROOM/CARE - ICU (200)

## 2017-10-28 PROCEDURE — 99233 SBSQ HOSP IP/OBS HIGH 50: CPT | Performed by: SURGERY

## 2017-10-28 PROCEDURE — 86923 COMPATIBILITY TEST ELECTRIC: CPT

## 2017-10-28 PROCEDURE — 83735 ASSAY OF MAGNESIUM: CPT

## 2017-10-28 RX ORDER — ACETAMINOPHEN 325 MG/1
650 TABLET ORAL ONCE
Status: COMPLETED | OUTPATIENT
Start: 2017-10-28 | End: 2017-10-28

## 2017-10-28 RX ORDER — DIPHENHYDRAMINE HYDROCHLORIDE 50 MG/ML
25 INJECTION INTRAMUSCULAR; INTRAVENOUS ONCE
Status: COMPLETED | OUTPATIENT
Start: 2017-10-28 | End: 2017-10-28

## 2017-10-28 RX ORDER — MAGNESIUM SULFATE HEPTAHYDRATE 40 MG/ML
2 INJECTION, SOLUTION INTRAVENOUS ONCE
Status: COMPLETED | OUTPATIENT
Start: 2017-10-28 | End: 2017-10-28

## 2017-10-28 RX ORDER — DIPHENHYDRAMINE HCL 25 MG
25 TABLET ORAL ONCE
Status: COMPLETED | OUTPATIENT
Start: 2017-10-28 | End: 2017-10-28

## 2017-10-28 RX ADMIN — ENOXAPARIN SODIUM 30 MG: 100 INJECTION SUBCUTANEOUS at 09:46

## 2017-10-28 RX ADMIN — OXYCODONE HYDROCHLORIDE 10 MG: 10 TABLET ORAL at 08:19

## 2017-10-28 RX ADMIN — HYDROMORPHONE HYDROCHLORIDE 1 MG: 1 INJECTION, SOLUTION INTRAMUSCULAR; INTRAVENOUS; SUBCUTANEOUS at 12:20

## 2017-10-28 RX ADMIN — OXYCODONE HYDROCHLORIDE 10 MG: 10 TABLET ORAL at 20:42

## 2017-10-28 RX ADMIN — IRON DEXTRAN 25 MG: 50 INJECTION INTRAMUSCULAR; INTRAVENOUS at 16:12

## 2017-10-28 RX ADMIN — OXYCODONE HYDROCHLORIDE 10 MG: 10 TABLET ORAL at 16:09

## 2017-10-28 RX ADMIN — STANDARDIZED SENNA CONCENTRATE AND DOCUSATE SODIUM 1 TABLET: 8.6; 5 TABLET, FILM COATED ORAL at 20:42

## 2017-10-28 RX ADMIN — ENOXAPARIN SODIUM 30 MG: 100 INJECTION SUBCUTANEOUS at 20:42

## 2017-10-28 RX ADMIN — IRON DEXTRAN 975 MG: 50 INJECTION INTRAMUSCULAR; INTRAVENOUS at 20:42

## 2017-10-28 RX ADMIN — POLYETHYLENE GLYCOL 3350 1 PACKET: 17 POWDER, FOR SOLUTION ORAL at 20:42

## 2017-10-28 RX ADMIN — DIPHENHYDRAMINE HYDROCHLORIDE 25 MG: 50 INJECTION, SOLUTION INTRAMUSCULAR; INTRAVENOUS at 16:04

## 2017-10-28 RX ADMIN — MAGNESIUM HYDROXIDE 30 ML: 400 SUSPENSION ORAL at 09:45

## 2017-10-28 RX ADMIN — SODIUM CHLORIDE, POTASSIUM CHLORIDE, SODIUM LACTATE AND CALCIUM CHLORIDE: 600; 310; 30; 20 INJECTION, SOLUTION INTRAVENOUS at 11:15

## 2017-10-28 RX ADMIN — DOCUSATE SODIUM 100 MG: 100 CAPSULE ORAL at 20:42

## 2017-10-28 RX ADMIN — OXYCODONE HYDROCHLORIDE 10 MG: 10 TABLET ORAL at 00:27

## 2017-10-28 RX ADMIN — POLYETHYLENE GLYCOL 3350 1 PACKET: 17 POWDER, FOR SOLUTION ORAL at 09:46

## 2017-10-28 RX ADMIN — ACETAMINOPHEN 650 MG: 325 TABLET, FILM COATED ORAL at 16:04

## 2017-10-28 RX ADMIN — MAGNESIUM SULFATE IN WATER 2 G: 40 INJECTION, SOLUTION INTRAVENOUS at 09:46

## 2017-10-28 RX ADMIN — DOCUSATE SODIUM 100 MG: 100 CAPSULE ORAL at 09:45

## 2017-10-28 RX ADMIN — INSULIN GLARGINE 10 UNITS: 100 INJECTION, SOLUTION SUBCUTANEOUS at 20:43

## 2017-10-28 RX ADMIN — Medication: at 20:42

## 2017-10-28 ASSESSMENT — PAIN SCALES - GENERAL
PAINLEVEL_OUTOF10: 1
PAINLEVEL_OUTOF10: 3
PAINLEVEL_OUTOF10: 5
PAINLEVEL_OUTOF10: 5
PAINLEVEL_OUTOF10: 7
PAINLEVEL_OUTOF10: 0
PAINLEVEL_OUTOF10: 4
PAINLEVEL_OUTOF10: 7
PAINLEVEL_OUTOF10: 1
PAINLEVEL_OUTOF10: 7
PAINLEVEL_OUTOF10: 4
PAINLEVEL_OUTOF10: 2
PAINLEVEL_OUTOF10: 5
PAINLEVEL_OUTOF10: 8
PAINLEVEL_OUTOF10: 3

## 2017-10-28 ASSESSMENT — ENCOUNTER SYMPTOMS
EYES NEGATIVE: 1
COUGH: 0
PALPITATIONS: 0
SHORTNESS OF BREATH: 0
TINGLING: 0
CARDIOVASCULAR NEGATIVE: 1
FEVER: 0
RESPIRATORY NEGATIVE: 1
NAUSEA: 0
CONSTITUTIONAL NEGATIVE: 1
VOMITING: 0
CHILLS: 0
HEADACHES: 0
ABDOMINAL PAIN: 1
SENSORY CHANGE: 0

## 2017-10-28 ASSESSMENT — PATIENT HEALTH QUESTIONNAIRE - PHQ9
2. FEELING DOWN, DEPRESSED, IRRITABLE, OR HOPELESS: NOT AT ALL
SUM OF ALL RESPONSES TO PHQ QUESTIONS 1-9: 0
SUM OF ALL RESPONSES TO PHQ9 QUESTIONS 1 AND 2: 0
1. LITTLE INTEREST OR PLEASURE IN DOING THINGS: NOT AT ALL

## 2017-10-28 ASSESSMENT — LIFESTYLE VARIABLES: DO YOU DRINK ALCOHOL: NO

## 2017-10-28 NOTE — PROGRESS NOTES
Orthopaedic Progress Note    Author: Ayden Mondragon Date & Time created: 10/28/2017   2:27 PM     Interval Events:  Seen and examined. C/O Back itching. Now  POD #2 S/P  R femur ORIF, L medial malleolus ORIF and I&D, B posterior pelvic ring injury percutaneous fixation with Dr Bajwa. Doing well this morning.  No other new complaints, pain controlled  .  Review of Systems   Constitutional: Negative for chills and fever.   Respiratory: Negative for cough and shortness of breath.    Cardiovascular: Negative for chest pain and palpitations.   Skin: Positive for itching.   Neurological: Negative for tingling, sensory change and headaches.     Hemodynamics:  Blood pressure 107/53, pulse (!) 106, temperature 37.6 °C (99.7 °F), resp. rate 18, weight 75.4 kg (166 lb 3.6 oz), SpO2 95 %, not currently breastfeeding.     No Active Precaution Orders    Respiratory:    Respiration: 18, Pulse Oximetry: 95 %     Work Of Breathing / Effort: Mild  RUL Breath Sounds: Clear;Diminished, RML Breath Sounds: Clear;Diminished, RLL Breath Sounds: Clear;Diminished, KEYUR Breath Sounds: Clear;Diminished, LLL Breath Sounds: Clear;Diminished  Fluids:    Intake/Output Summary (Last 24 hours) at 10/28/17 1427  Last data filed at 10/28/17 1300   Gross per 24 hour   Intake             1040 ml   Output             3370 ml   Net            -2330 ml     Admit Weight: 68.1 kg (150 lb 2.1 oz)  Current Weight: 75.4 kg (166 lb 3.6 oz)    Physical Exam   Musculoskeletal:   Surgical dressings clean, dry, and intact. Patient clearly fires tibialis anterior, EHL, and gastrocnemius/soleus. Sensation is intact to light touch throughout superficial peroneal, deep peroneal, tibial, saphenous, and sural nerve distributions. Strong and palpable 2+ dorsalis pedis and posterior tibial pulses with capillary refill less than 2 seconds. No lower leg tenderness or discomfort.       Labs:  Recent Labs      10/27/17   0405  10/27/17   1553  10/28/17   0430  10/28/17    1123   WBC  5.7   --   5.8  6.2   RBC  2.79*   --   2.23*  2.70*   HEMOGLOBIN  8.2*  7.7*  6.5*  7.9*   HEMATOCRIT  23.7*   --   19.6*  23.2*   MCV  84.9   --   87.9  85.9   MCH  29.4   --   29.1  29.3   MCHC  34.6   --   33.2*  34.1   RDW  47.9   --   50.0  46.8   PLATELETCT  130*   --   117*  112*   MPV  9.3   --   9.1  8.9*     Recent Labs      10/26/17   0220  10/27/17   0405  10/28/17   0430   SODIUM  134*  131*  135   POTASSIUM  4.0  3.8  3.7   CHLORIDE  108  102  101   CO2  19*  27  30   GLUCOSE  186*  208*  155*   BUN  11  7*  5*   CREATININE  0.39*  0.37*  0.30*   CALCIUM  6.5*  6.9*  7.0*       Medical Decision Making/Problem List:    Active Hospital Problems    Diagnosis   • Femur fracture (CMS-HCC) [S72.90XA]   • Multiple fractures of pelvis (CMS-HCC) [S32.810A]   • Traumatic rupture of bladder [S37.29XA]   • Contraindication to deep vein thrombosis (DVT) prophylaxis [Z53.09]   • Closed nondisplaced fracture of lateral malleolus of left fibula [S82.65XA]   • Type 2 diabetes mellitus (CMS-HCC) [E11.9]   • Spleen laceration [S36.039A]   • HTN (hypertension) [I10]     Core Measures & Quality Metrics:  Current DVT prophylaxis: SCDs, lovenox  Discussed patient condition with RN.  Clearance for lovenox/heparin: yes   Weight Bearing Status: TTWB BLE  Wounds & Drains: Dressing change tomorrow if needed  Disposition and Follow-up: pending transfer from ICU    Recommend topical relief agent for pruritis

## 2017-10-28 NOTE — DIETARY
Nutrition note:  Pt is on day #3 of NPO/clears. Pt had surgery on 10/25 and 10/26, needs nutrition for healing. Pt remains on a diabetic clear liquid diet which contains minimal nutrition.  Please advance diet.  RD following.

## 2017-10-28 NOTE — PROGRESS NOTES
Iron Replacement per Pharmacy:    Reason for Iron Replacement: Acute blood loss    Recent Labs      10/28/17   0430  10/28/17   1123   WBC  5.8  6.2   RBC  2.23*  2.70*   HEMOGLOBIN  6.5*  7.9*   HEMATOCRIT  19.6*  23.2*   MCV  87.9  85.9   MCH  29.1  29.3   MCHC  33.2*  34.1   MPV  9.1  8.9*   IRON  11*   --         CrCl cannot be calculated (Unknown ideal weight.).    Assessment/Plan:  1. IV Iron Indicated.   2. Give Iron Dextran 25 mg IV test dose following diphenhydramine/acetaminophen premeds over 30 minutes per protocol.  3. If no reaction (Anaphylaxis, Hypotension/Hypertension, N/V/D, Chest pain/Back Pain, Urticaria/Pruritis) in the next hour, proceed to full dose. Nursing to call the pharmacy IV room at ext. [phone] for full dose.  4. Full dose: Iron Dextran 1000 mg IV over 4 hours. Continue to monitor for delayed ADR including: Arthralgia/myalgia, Headache/backache, chills/dizziness/malaise, moderate to high fever and n/v.      Federico Collier, PharmD, BCPS

## 2017-10-28 NOTE — CARE PLAN
Problem: Safety  Goal: Will remain free from injury  Outcome: PROGRESSING AS EXPECTED  Pt educated on importance of utilizing call bell prior to mobilization, call bell placed at bedside and will continue to monitor.     Problem: Infection  Goal: Will remain free from infection  Outcome: PROGRESSING AS EXPECTED

## 2017-10-29 PROBLEM — E83.39 HYPOPHOSPHATEMIA: Status: ACTIVE | Noted: 2017-10-29

## 2017-10-29 LAB
ALBUMIN SERPL BCP-MCNC: 2.2 G/DL (ref 3.2–4.9)
ALBUMIN/GLOB SERPL: 1 G/DL
ALP SERPL-CCNC: 43 U/L (ref 30–99)
ALT SERPL-CCNC: 19 U/L (ref 2–50)
ANION GAP SERPL CALC-SCNC: 4 MMOL/L (ref 0–11.9)
AST SERPL-CCNC: 45 U/L (ref 12–45)
BASOPHILS # BLD AUTO: 0.2 % (ref 0–1.8)
BASOPHILS # BLD AUTO: 0.5 % (ref 0–1.8)
BASOPHILS # BLD: 0.01 K/UL (ref 0–0.12)
BASOPHILS # BLD: 0.03 K/UL (ref 0–0.12)
BILIRUB SERPL-MCNC: 0.9 MG/DL (ref 0.1–1.5)
BUN SERPL-MCNC: 5 MG/DL (ref 8–22)
CALCIUM SERPL-MCNC: 7.4 MG/DL (ref 8.5–10.5)
CHLORIDE SERPL-SCNC: 104 MMOL/L (ref 96–112)
CO2 SERPL-SCNC: 31 MMOL/L (ref 20–33)
CREAT SERPL-MCNC: 0.31 MG/DL (ref 0.5–1.4)
EOSINOPHIL # BLD AUTO: 0.19 K/UL (ref 0–0.51)
EOSINOPHIL # BLD AUTO: 0.19 K/UL (ref 0–0.51)
EOSINOPHIL NFR BLD: 3.2 % (ref 0–6.9)
EOSINOPHIL NFR BLD: 3.3 % (ref 0–6.9)
ERYTHROCYTE [DISTWIDTH] IN BLOOD BY AUTOMATED COUNT: 49.5 FL (ref 35.9–50)
ERYTHROCYTE [DISTWIDTH] IN BLOOD BY AUTOMATED COUNT: 49.6 FL (ref 35.9–50)
EST. AVERAGE GLUCOSE BLD GHB EST-MCNC: 126 MG/DL
GFR SERPL CREATININE-BSD FRML MDRD: >60 ML/MIN/1.73 M 2
GLOBULIN SER CALC-MCNC: 2.3 G/DL (ref 1.9–3.5)
GLUCOSE BLD-MCNC: 109 MG/DL (ref 65–99)
GLUCOSE BLD-MCNC: 112 MG/DL (ref 65–99)
GLUCOSE BLD-MCNC: 133 MG/DL (ref 65–99)
GLUCOSE BLD-MCNC: 180 MG/DL (ref 65–99)
GLUCOSE BLD-MCNC: 92 MG/DL (ref 65–99)
GLUCOSE SERPL-MCNC: 90 MG/DL (ref 65–99)
HBA1C MFR BLD: 6 % (ref 0–5.6)
HCT VFR BLD AUTO: 24 % (ref 37–47)
HCT VFR BLD AUTO: 24.8 % (ref 37–47)
HGB BLD-MCNC: 8.1 G/DL (ref 12–16)
HGB BLD-MCNC: 8.2 G/DL (ref 12–16)
IMM GRANULOCYTES # BLD AUTO: 0.08 K/UL (ref 0–0.11)
IMM GRANULOCYTES # BLD AUTO: 0.13 K/UL (ref 0–0.11)
IMM GRANULOCYTES NFR BLD AUTO: 1.3 % (ref 0–0.9)
IMM GRANULOCYTES NFR BLD AUTO: 2.3 % (ref 0–0.9)
LYMPHOCYTES # BLD AUTO: 1.3 K/UL (ref 1–4.8)
LYMPHOCYTES # BLD AUTO: 1.42 K/UL (ref 1–4.8)
LYMPHOCYTES NFR BLD: 21.9 % (ref 22–41)
LYMPHOCYTES NFR BLD: 24.9 % (ref 22–41)
MAGNESIUM SERPL-MCNC: 2.3 MG/DL (ref 1.5–2.5)
MCH RBC QN AUTO: 29.6 PG (ref 27–33)
MCH RBC QN AUTO: 30.2 PG (ref 27–33)
MCHC RBC AUTO-ENTMCNC: 33.1 G/DL (ref 33.6–35)
MCHC RBC AUTO-ENTMCNC: 33.8 G/DL (ref 33.6–35)
MCV RBC AUTO: 89.5 FL (ref 81.4–97.8)
MCV RBC AUTO: 89.6 FL (ref 81.4–97.8)
MONOCYTES # BLD AUTO: 0.54 K/UL (ref 0–0.85)
MONOCYTES # BLD AUTO: 0.56 K/UL (ref 0–0.85)
MONOCYTES NFR BLD AUTO: 9.1 % (ref 0–13.4)
MONOCYTES NFR BLD AUTO: 9.8 % (ref 0–13.4)
NEUTROPHILS # BLD AUTO: 3.38 K/UL (ref 2–7.15)
NEUTROPHILS # BLD AUTO: 3.82 K/UL (ref 2–7.15)
NEUTROPHILS NFR BLD: 59.2 % (ref 44–72)
NEUTROPHILS NFR BLD: 64.3 % (ref 44–72)
NRBC # BLD AUTO: 0.04 K/UL
NRBC # BLD AUTO: 0.05 K/UL
NRBC BLD AUTO-RTO: 0.7 /100 WBC
NRBC BLD AUTO-RTO: 0.8 /100 WBC
PHOSPHATE SERPL-MCNC: 1.7 MG/DL (ref 2.5–4.5)
PLATELET # BLD AUTO: 139 K/UL (ref 164–446)
PLATELET # BLD AUTO: 168 K/UL (ref 164–446)
PMV BLD AUTO: 9.1 FL (ref 9–12.9)
PMV BLD AUTO: 9.4 FL (ref 9–12.9)
POTASSIUM SERPL-SCNC: 3.7 MMOL/L (ref 3.6–5.5)
PROT SERPL-MCNC: 4.5 G/DL (ref 6–8.2)
RBC # BLD AUTO: 2.68 M/UL (ref 4.2–5.4)
RBC # BLD AUTO: 2.77 M/UL (ref 4.2–5.4)
SODIUM SERPL-SCNC: 139 MMOL/L (ref 135–145)
WBC # BLD AUTO: 5.7 K/UL (ref 4.8–10.8)
WBC # BLD AUTO: 5.9 K/UL (ref 4.8–10.8)

## 2017-10-29 PROCEDURE — 700105 HCHG RX REV CODE 258: Performed by: SURGERY

## 2017-10-29 PROCEDURE — 700102 HCHG RX REV CODE 250 W/ 637 OVERRIDE(OP): Performed by: SURGERY

## 2017-10-29 PROCEDURE — 700111 HCHG RX REV CODE 636 W/ 250 OVERRIDE (IP): Performed by: SURGERY

## 2017-10-29 PROCEDURE — 83735 ASSAY OF MAGNESIUM: CPT

## 2017-10-29 PROCEDURE — 80053 COMPREHEN METABOLIC PANEL: CPT

## 2017-10-29 PROCEDURE — A9270 NON-COVERED ITEM OR SERVICE: HCPCS | Performed by: SURGERY

## 2017-10-29 PROCEDURE — 83036 HEMOGLOBIN GLYCOSYLATED A1C: CPT

## 2017-10-29 PROCEDURE — 82962 GLUCOSE BLOOD TEST: CPT | Mod: 91

## 2017-10-29 PROCEDURE — 700112 HCHG RX REV CODE 229: Performed by: SURGERY

## 2017-10-29 PROCEDURE — 93970 EXTREMITY STUDY: CPT | Mod: 26 | Performed by: SURGERY

## 2017-10-29 PROCEDURE — 770022 HCHG ROOM/CARE - ICU (200)

## 2017-10-29 PROCEDURE — 85025 COMPLETE CBC W/AUTO DIFF WBC: CPT | Mod: 91

## 2017-10-29 PROCEDURE — 700101 HCHG RX REV CODE 250: Performed by: SURGERY

## 2017-10-29 PROCEDURE — 84100 ASSAY OF PHOSPHORUS: CPT

## 2017-10-29 PROCEDURE — 93971 EXTREMITY STUDY: CPT

## 2017-10-29 PROCEDURE — 99233 SBSQ HOSP IP/OBS HIGH 50: CPT | Performed by: SURGERY

## 2017-10-29 RX ADMIN — OXYCODONE HYDROCHLORIDE 10 MG: 10 TABLET ORAL at 10:11

## 2017-10-29 RX ADMIN — STANDARDIZED SENNA CONCENTRATE AND DOCUSATE SODIUM 1 TABLET: 8.6; 5 TABLET, FILM COATED ORAL at 21:46

## 2017-10-29 RX ADMIN — DIBASIC SODIUM PHOSPHATE, MONOBASIC POTASSIUM PHOSPHATE AND MONOBASIC SODIUM PHOSPHATE 2 TABLET: 852; 155; 130 TABLET ORAL at 15:00

## 2017-10-29 RX ADMIN — DOCUSATE SODIUM 100 MG: 100 CAPSULE ORAL at 08:42

## 2017-10-29 RX ADMIN — OXYCODONE HYDROCHLORIDE 10 MG: 10 TABLET ORAL at 14:37

## 2017-10-29 RX ADMIN — MAGNESIUM HYDROXIDE 30 ML: 400 SUSPENSION ORAL at 08:40

## 2017-10-29 RX ADMIN — POLYETHYLENE GLYCOL 3350 1 PACKET: 17 POWDER, FOR SOLUTION ORAL at 21:46

## 2017-10-29 RX ADMIN — DOCUSATE SODIUM 100 MG: 100 CAPSULE ORAL at 21:46

## 2017-10-29 RX ADMIN — SODIUM CHLORIDE, POTASSIUM CHLORIDE, SODIUM LACTATE AND CALCIUM CHLORIDE: 600; 310; 30; 20 INJECTION, SOLUTION INTRAVENOUS at 05:40

## 2017-10-29 RX ADMIN — OXYCODONE HYDROCHLORIDE 10 MG: 10 TABLET ORAL at 05:31

## 2017-10-29 RX ADMIN — OXYCODONE HYDROCHLORIDE 10 MG: 10 TABLET ORAL at 02:28

## 2017-10-29 RX ADMIN — POTASSIUM PHOSPHATE, MONOBASIC AND POTASSIUM PHOSPHATE, DIBASIC 30 MMOL: 224; 236 INJECTION, SOLUTION INTRAVENOUS at 08:40

## 2017-10-29 RX ADMIN — DIBASIC SODIUM PHOSPHATE, MONOBASIC POTASSIUM PHOSPHATE AND MONOBASIC SODIUM PHOSPHATE 2 TABLET: 852; 155; 130 TABLET ORAL at 21:46

## 2017-10-29 RX ADMIN — OXYCODONE HYDROCHLORIDE 10 MG: 10 TABLET ORAL at 20:06

## 2017-10-29 RX ADMIN — DIBASIC SODIUM PHOSPHATE, MONOBASIC POTASSIUM PHOSPHATE AND MONOBASIC SODIUM PHOSPHATE 2 TABLET: 852; 155; 130 TABLET ORAL at 11:22

## 2017-10-29 RX ADMIN — POLYETHYLENE GLYCOL 3350 1 PACKET: 17 POWDER, FOR SOLUTION ORAL at 08:41

## 2017-10-29 RX ADMIN — INSULIN GLARGINE 10 UNITS: 100 INJECTION, SOLUTION SUBCUTANEOUS at 21:46

## 2017-10-29 RX ADMIN — ENOXAPARIN SODIUM 30 MG: 100 INJECTION SUBCUTANEOUS at 08:41

## 2017-10-29 ASSESSMENT — ENCOUNTER SYMPTOMS
EYES NEGATIVE: 1
SHORTNESS OF BREATH: 0
MYALGIAS: 1
FEVER: 0
RESPIRATORY NEGATIVE: 1
NAUSEA: 0
CONSTITUTIONAL NEGATIVE: 1
VOMITING: 0
CARDIOVASCULAR NEGATIVE: 1

## 2017-10-29 ASSESSMENT — PATIENT HEALTH QUESTIONNAIRE - PHQ9
2. FEELING DOWN, DEPRESSED, IRRITABLE, OR HOPELESS: NOT AT ALL
SUM OF ALL RESPONSES TO PHQ9 QUESTIONS 1 AND 2: 0
1. LITTLE INTEREST OR PLEASURE IN DOING THINGS: NOT AT ALL
SUM OF ALL RESPONSES TO PHQ QUESTIONS 1-9: 0

## 2017-10-29 ASSESSMENT — PAIN SCALES - GENERAL
PAINLEVEL_OUTOF10: 4
PAINLEVEL_OUTOF10: 7
PAINLEVEL_OUTOF10: 10
PAINLEVEL_OUTOF10: 7
PAINLEVEL_OUTOF10: 0
PAINLEVEL_OUTOF10: 3
PAINLEVEL_OUTOF10: 0
PAINLEVEL_OUTOF10: 7
PAINLEVEL_OUTOF10: 7
PAINLEVEL_OUTOF10: 6
PAINLEVEL_OUTOF10: 1
PAINLEVEL_OUTOF10: 0
PAINLEVEL_OUTOF10: 3
PAINLEVEL_OUTOF10: 0
PAINLEVEL_OUTOF10: 3
PAINLEVEL_OUTOF10: 4

## 2017-10-29 ASSESSMENT — LIFESTYLE VARIABLES: DO YOU DRINK ALCOHOL: NO

## 2017-10-29 NOTE — PROGRESS NOTES
"  Trauma/Surgical Progress Note    Author: Apolinar Pan Date & Time created: 10/28/2017   9:56 PM     Interval Events:    Hb remains low - continue transfusions to keep Hb > 7  Check iron studies and treat per protocol with IV iron dextran  Advance to diabetic soft diet  Continue lantus and sliding scale insulin  Itching noted with IV dilaudid - DC and use fentanyl for prn    Review of Systems   Constitutional: Negative.    HENT: Negative.    Eyes: Negative.    Respiratory: Negative.    Cardiovascular: Negative.  Negative for chest pain.   Gastrointestinal: Positive for abdominal pain. Negative for nausea and vomiting.   Genitourinary: Negative.    Musculoskeletal: Positive for joint pain.   Skin: Positive for itching.     Hemodynamics:  Blood pressure 107/53, pulse (!) 103, temperature 37.6 °C (99.7 °F), resp. rate (!) 24, height 1.499 m (4' 11\"), weight 75.4 kg (166 lb 3.6 oz), SpO2 100 %, not currently breastfeeding.     Respiratory:    Respiration: (!) 24, Pulse Oximetry: 100 %     Work Of Breathing / Effort: Mild  RUL Breath Sounds: Clear;Diminished, RML Breath Sounds: Clear;Diminished, RLL Breath Sounds: Clear;Diminished, KEYUR Breath Sounds: Clear;Diminished, LLL Breath Sounds: Clear;Diminished  Fluids:    Intake/Output Summary (Last 24 hours) at 10/28/17 2156  Last data filed at 10/28/17 1800   Gross per 24 hour   Intake             1380 ml   Output             3420 ml   Net            -2040 ml     Admit Weight: 68.1 kg (150 lb 2.1 oz)  Current Weight: 75.4 kg (166 lb 3.6 oz)    Physical Exam   Constitutional: Vital signs are normal. She appears well-developed. She is sleeping. She does not appear ill. No distress. Nasal cannula in place.   HENT:   Head: Normocephalic and atraumatic.   Right Ear: Hearing normal.   Left Ear: Hearing normal.   Nose: Nose normal.   Eyes: Conjunctivae and EOM are normal. Pupils are equal, round, and reactive to light.   Neck: Trachea normal.   Cardiovascular: Normal rate, " regular rhythm, intact distal pulses and normal pulses.    Pulmonary/Chest: Breath sounds normal. Accessory muscle usage present. No stridor. No respiratory distress. She exhibits no tenderness.   Abdominal: Soft. Normal appearance. She exhibits no distension. There is tenderness in the suprapubic area.   Genitourinary:   Genitourinary Comments: Parr to gravity.  Draining clear yellow urine   Neurological: She is alert. GCS eye subscore is 4. GCS verbal subscore is 5. GCS motor subscore is 6.   Skin: Skin is warm and dry.   Psychiatric: She has a normal mood and affect. Her speech is normal and behavior is normal.   Nursing note and vitals reviewed.      Medical Decision Making/Problem List:    Active Hospital Problems    Diagnosis   • Anemia associated with acute blood loss [D62]     Priority: High     10/28 - transfuse 1 unit PRBC  Transfuse 1 unit PRBC if Hb < 7.0  10/28 - Iron studies low - replace per protocol.     • Closed fracture of right femur (CMS-HCC) [S72.91XA]     Priority: High      Comminuted fractures of the proximal and distal femur, right .  10/26 - ORIF  Weight bearing status - TTWB BLE.  Ortho - Emory Bajwa MD     • Hypomagnesemia [E83.42]     Priority: Medium     10/27 - Magnesium low - replace and follow.     • Contraindication to deep vein thrombosis (DVT) prophylaxis [Z53.09]     Priority: Medium     Systemic anticoagulation contraindicated secondary to elevated bleeding risk.  10/28 - Duplex LE study ordered     • Closed nondisplaced fracture of lateral malleolus of left fibula [S82.65XA]     Priority: Medium     Minimally displaced  Small adjacent puncture wound.  10/26 - ORIF  Weight bearing status - TTWB.  Emory Bajwa MD       • Type 2 diabetes mellitus (CMS-HCC) [E11.9]     Priority: Medium     High ISS and serial BS ordered     • Multiple fractures of pelvis (CMS-HCC) [S32.810A]     Priority: Medium     Bilateral superior and inferior pubic rami fractures. Displaced on the left.    There is likely fracture of at least the right sacral alar.   Possible fracture of the right posterior acetabulum.  10/26 - Percutaneous screw fixation  Weight bearing status - TTWB BLE.  Ortho - Emory Bajwa MD       • Spleen laceration [S36.039A]     Priority: Medium     Small grade 1 laceration, lateral inferior aspect of the spleen.  Trend hemoglobin     • Traumatic rupture of bladder [S37.29XA]     Priority: Medium     Intra-peritoneal rupture  10/25 - Layered closure  Parr to remain in place for 10 days  Cystogram prior to removal  Surgeon - JARROD Tsang MD     • HTN (hypertension) [I10]     Priority: Low     Home medication verification per pharmacy.        Core Measures & Quality Metrics:  Radiology images reviewed, Labs reviewed and Medications reviewed  Parr catheter: Critically Ill - Requiring Accurate Measurement of Urinary Output and Urologic Surgery or Other Surgery on Contiguous Structures of the Genitourinary Tract or Studies      DVT Prophylaxis: Contraindicated - High bleeding risk  DVT prophylaxis - mechanical: SCDs  Ulcer prophylaxis: Not indicated        YUNI Score  Discussed patient condition with RN, RT, Pharmacy and Patient.  CRITICAL CARE TIME EXCLUDING PROCEDURES: 37 minutes

## 2017-10-29 NOTE — PROGRESS NOTES
"S:  Seen and examined.  POD #3 s/p R femur ORIF, L medial malleolus ORIF and I&D, B posterior pelvic ring injury percutaneous fixation.  Doing well this morning.  No new complaints, pain controlled.    O: Blood pressure 107/53, pulse 91, temperature 37.6 °C (99.7 °F), resp. rate 13, height 1.499 m (4' 11\"), weight 78.6 kg (173 lb 4.5 oz), SpO2 96 %, not currently breastfeeding..      Intake/Output Summary (Last 24 hours) at 10/29/17 0956  Last data filed at 10/29/17 0600   Gross per 24 hour   Intake             1825 ml   Output             3565 ml   Net            -1740 ml   .    Bilateral lower extremities examined.  Sensation intact about foot bilaterally.  Toes warm, well-perfused.  Wounds all c/d/i.    Recent Labs      10/28/17   0430  10/28/17   1123  10/29/17   0535   WBC  5.8  6.2  5.9   RBC  2.23*  2.70*  2.68*   HEMOGLOBIN  6.5*  7.9*  8.1*   HEMATOCRIT  19.6*  23.2*  24.0*   MCV  87.9  85.9  89.6   MCH  29.1  29.3  30.2   MCHC  33.2*  34.1  33.8   RDW  50.0  46.8  49.6   PLATELETCT  117*  112*  139*   MPV  9.1  8.9*  9.4       A/P:    #1 Bilateral posterior pelvic ring injuries s/p SI screw placement  #2 Right subtrochanteric femur fracture s/p IM nail  #3 Right distal femur fx s/p ORIF  #4 Left open medial malleolus fracture s/p I&D, ORIF    Antibiotics: Ancef x24 hrs for Grade 1 open fracture, now complete.  NO additional for ortho required  Activity: TTWB BLE.  PT today or as able.  Knee ROM and ankle ROM.  Boot to left foot, hinged brace, unlocked, to right knee.  Diet: General  DVT: Mechanical (SCDs) + Pharmacologic (Lovenox, OK to begin from ortho perspective when OK per trauma)  Dispo: D/C planning    "

## 2017-10-29 NOTE — CARE PLAN
Problem: Infection  Goal: Will remain free from infection  Outcome: PROGRESSING AS EXPECTED  Pt is healing as expected by Md, will continue to maintain proper infection control protocol at this time

## 2017-10-29 NOTE — CARE PLAN
Problem: Communication  Goal: The ability to communicate needs accurately and effectively will improve  Discussed plan of care with patient. Patient primarily speaks Turkmen, but understands English and responds with simple sentences. Patient follows commands during assessments. Family members at the bedside. Education provided to both patient and patient's family members. Language line available when needed.     Problem: Safety  Goal: Will remain free from falls  Proper interventions in place to decrease risk of falling. Patient verbalized understanding of fall precautions in place.     Problem: Venous Thromboembolism (VTW)/Deep Vein Thrombosis (DVT) Prevention:  Goal: Patient will participate in Venous Thrombosis (VTE)/Deep Vein Thrombosis (DVT)Prevention Measures  SCDs on and working properly. Discussed and monitoring for s/s DVTs. Lovenox given per MAR.     Problem: Bowel/Gastric:  Goal: Normal bowel function is maintained or improved  Stool softeners given per MAR. Encouraging patient to increase diet intake. Discussed interventions to promote bowel evacuation. Patient refused suppository and digital stimulation at this time. Will encourage to increase fiber and fluid intake as well as taking stool softeners. Normoactive bowel tones noted x4.     Problem: Pain Management  Goal: Pain level will decrease to patient's comfort goal  Multimodal approach to pain in place via pain meds per MAR and comfort measures. Monitoring for adverse effects of pain meds and discussed side effects. Repositioning and cold/heat packs offered and applied. Itching cream applied to back area for relief. Patient able to self report pain using 0-10 pain scale. Established comfort level and tolerance level. Q2h pain assessment in place.

## 2017-10-29 NOTE — CARE PLAN
Problem: Safety  Goal: Will remain free from injury  Outcome: PROGRESSING AS EXPECTED  Pt has remained free from injury at this time, will continue to implement safety protocol at this time

## 2017-10-29 NOTE — PROGRESS NOTES
"Orthopaedic Progress Note    Author: Ayden Mondragon Date & Time created: 10/29/2017   8:27 AM     Interval Events:  Doing well POD#2 S/P ORIF R prox femur (Hip nail), R distal femur (plate/screw fix), and L ankle.    Review of Systems   Constitutional: Negative for fever.   Respiratory: Negative for shortness of breath.    Cardiovascular: Negative for chest pain.     Hemodynamics:  Blood pressure 107/53, pulse 81, temperature 37.6 °C (99.7 °F), resp. rate 20, height 1.499 m (4' 11\"), weight 78.6 kg (173 lb 4.5 oz), SpO2 99 %, not currently breastfeeding.     No Active Precaution Orders    Respiratory:    Respiration: 20, Pulse Oximetry: 99 %     Work Of Breathing / Effort: Mild  RUL Breath Sounds: Clear, RML Breath Sounds: Clear, RLL Breath Sounds: Clear;Diminished, KEYUR Breath Sounds: Clear, LLL Breath Sounds: Clear;Diminished  Fluids:    Intake/Output Summary (Last 24 hours) at 10/29/17 0827  Last data filed at 10/29/17 0600   Gross per 24 hour   Intake             2200 ml   Output             3765 ml   Net            -1565 ml     Admit Weight: 68.1 kg (150 lb 2.1 oz)  Current Weight: 78.6 kg (173 lb 4.5 oz)    Physical Exam   Musculoskeletal:   Surgical dressing with mild serosang discharge, old, changed today. Patient clearly fires tibialis anterior, EHL, and gastrocnemius/soleus. Sensation is intact to light touch throughout superficial peroneal, deep peroneal, tibial, saphenous, and sural nerve distributions. Strong and palpable 2+ dorsalis pedis and posterior tibial pulses with capillary refill less than 2 seconds. No lower leg tenderness or discomfort.       Labs:  Recent Labs      10/28/17   0430  10/28/17   1123  10/29/17   0535   WBC  5.8  6.2  5.9   RBC  2.23*  2.70*  2.68*   HEMOGLOBIN  6.5*  7.9*  8.1*   HEMATOCRIT  19.6*  23.2*  24.0*   MCV  87.9  85.9  89.6   MCH  29.1  29.3  30.2   MCHC  33.2*  34.1  33.8   RDW  50.0  46.8  49.6   PLATELETCT  117*  112*  139*   MPV  9.1  8.9*  9.4     Recent " Labs      10/27/17   0405  10/28/17   0430  10/29/17   0535   SODIUM  131*  135  139   POTASSIUM  3.8  3.7  3.7   CHLORIDE  102  101  104   CO2  27  30  31   GLUCOSE  208*  155*  90   BUN  7*  5*  5*   CREATININE  0.37*  0.30*  0.31*   CALCIUM  6.9*  7.0*  7.4*       Medical Decision Making/Problem List:    Active Hospital Problems    Diagnosis   • Anemia associated with acute blood loss [D62]   • Closed fracture of right femur (CMS-HCC) [S72.91XA]   • Hypomagnesemia [E83.42]   • Contraindication to deep vein thrombosis (DVT) prophylaxis [Z53.09]   • Closed nondisplaced fracture of lateral malleolus of left fibula [S82.65XA]   • Type 2 diabetes mellitus (CMS-HCC) [E11.9]   • Multiple fractures of pelvis (CMS-HCC) [S32.810A]   • Spleen laceration [S36.039A]   • Traumatic rupture of bladder [S37.29XA]   • HTN (hypertension) [I10]     Core Measures & Quality Metrics:  Current DVT prophylaxis: Lovenox 30 mg Q12  Discussed patient condition with RN.  Clearance for lovenox/heparin: yes  Weight Bearing Status: TTWB BLE  Wounds & Drains: Dresssing changed today, mild augustus-dressing blistering, incorporated into new dressing.  Disposition and Follow-up: will follow, antipate transition out of SICU when stable

## 2017-10-30 PROBLEM — S32.9XXA PELVIC FRACTURE (HCC): Status: ACTIVE | Noted: 2017-10-30

## 2017-10-30 PROBLEM — E83.39 HYPOPHOSPHATEMIA: Status: RESOLVED | Noted: 2017-10-29 | Resolved: 2017-10-30

## 2017-10-30 PROBLEM — Z78.9 NO CONTRAINDICATION TO DEEP VEIN THROMBOSIS (DVT) PROPHYLAXIS: Status: ACTIVE | Noted: 2017-10-26

## 2017-10-30 PROBLEM — E03.9 HYPOTHYROID: Status: ACTIVE | Noted: 2017-10-30

## 2017-10-30 PROBLEM — E78.5 HYPERLIPIDEMIA: Status: ACTIVE | Noted: 2017-10-30

## 2017-10-30 PROBLEM — E83.42 HYPOMAGNESEMIA: Status: RESOLVED | Noted: 2017-10-28 | Resolved: 2017-10-30

## 2017-10-30 LAB
ALBUMIN SERPL BCP-MCNC: 2.3 G/DL (ref 3.2–4.9)
ALBUMIN/GLOB SERPL: 1 G/DL
ALP SERPL-CCNC: 41 U/L (ref 30–99)
ALT SERPL-CCNC: 17 U/L (ref 2–50)
ANION GAP SERPL CALC-SCNC: 7 MMOL/L (ref 0–11.9)
AST SERPL-CCNC: 35 U/L (ref 12–45)
BASOPHILS # BLD AUTO: 0.3 % (ref 0–1.8)
BASOPHILS # BLD: 0.02 K/UL (ref 0–0.12)
BILIRUB SERPL-MCNC: 1.3 MG/DL (ref 0.1–1.5)
BUN SERPL-MCNC: 5 MG/DL (ref 8–22)
CALCIUM SERPL-MCNC: 7.5 MG/DL (ref 8.5–10.5)
CHLORIDE SERPL-SCNC: 104 MMOL/L (ref 96–112)
CO2 SERPL-SCNC: 28 MMOL/L (ref 20–33)
CREAT SERPL-MCNC: 0.2 MG/DL (ref 0.5–1.4)
EOSINOPHIL # BLD AUTO: 0.22 K/UL (ref 0–0.51)
EOSINOPHIL NFR BLD: 3.8 % (ref 0–6.9)
ERYTHROCYTE [DISTWIDTH] IN BLOOD BY AUTOMATED COUNT: 49.5 FL (ref 35.9–50)
GFR SERPL CREATININE-BSD FRML MDRD: >60 ML/MIN/1.73 M 2
GLOBULIN SER CALC-MCNC: 2.3 G/DL (ref 1.9–3.5)
GLUCOSE BLD-MCNC: 115 MG/DL (ref 65–99)
GLUCOSE BLD-MCNC: 130 MG/DL (ref 65–99)
GLUCOSE BLD-MCNC: 94 MG/DL (ref 65–99)
GLUCOSE SERPL-MCNC: 89 MG/DL (ref 65–99)
HCT VFR BLD AUTO: 25 % (ref 37–47)
HGB BLD-MCNC: 8.3 G/DL (ref 12–16)
IMM GRANULOCYTES # BLD AUTO: 0.29 K/UL (ref 0–0.11)
IMM GRANULOCYTES NFR BLD AUTO: 5 % (ref 0–0.9)
LYMPHOCYTES # BLD AUTO: 1.4 K/UL (ref 1–4.8)
LYMPHOCYTES NFR BLD: 24.3 % (ref 22–41)
MAGNESIUM SERPL-MCNC: 2 MG/DL (ref 1.5–2.5)
MCH RBC QN AUTO: 30.2 PG (ref 27–33)
MCHC RBC AUTO-ENTMCNC: 33.2 G/DL (ref 33.6–35)
MCV RBC AUTO: 90.9 FL (ref 81.4–97.8)
MONOCYTES # BLD AUTO: 0.66 K/UL (ref 0–0.85)
MONOCYTES NFR BLD AUTO: 11.4 % (ref 0–13.4)
NEUTROPHILS # BLD AUTO: 3.18 K/UL (ref 2–7.15)
NEUTROPHILS NFR BLD: 55.2 % (ref 44–72)
NRBC # BLD AUTO: 0.07 K/UL
NRBC BLD AUTO-RTO: 1.2 /100 WBC
PHOSPHATE SERPL-MCNC: 3.2 MG/DL (ref 2.5–4.5)
PLATELET # BLD AUTO: 180 K/UL (ref 164–446)
PMV BLD AUTO: 9.2 FL (ref 9–12.9)
POTASSIUM SERPL-SCNC: 3.8 MMOL/L (ref 3.6–5.5)
PROT SERPL-MCNC: 4.6 G/DL (ref 6–8.2)
RBC # BLD AUTO: 2.75 M/UL (ref 4.2–5.4)
SODIUM SERPL-SCNC: 139 MMOL/L (ref 135–145)
WBC # BLD AUTO: 5.8 K/UL (ref 4.8–10.8)

## 2017-10-30 PROCEDURE — A9270 NON-COVERED ITEM OR SERVICE: HCPCS | Performed by: SURGERY

## 2017-10-30 PROCEDURE — 83735 ASSAY OF MAGNESIUM: CPT

## 2017-10-30 PROCEDURE — 700102 HCHG RX REV CODE 250 W/ 637 OVERRIDE(OP): Performed by: SURGERY

## 2017-10-30 PROCEDURE — 700112 HCHG RX REV CODE 229: Performed by: SURGERY

## 2017-10-30 PROCEDURE — 700111 HCHG RX REV CODE 636 W/ 250 OVERRIDE (IP): Performed by: SURGERY

## 2017-10-30 PROCEDURE — 700102 HCHG RX REV CODE 250 W/ 637 OVERRIDE(OP): Performed by: NURSE PRACTITIONER

## 2017-10-30 PROCEDURE — 85025 COMPLETE CBC W/AUTO DIFF WBC: CPT

## 2017-10-30 PROCEDURE — 84100 ASSAY OF PHOSPHORUS: CPT

## 2017-10-30 PROCEDURE — A9270 NON-COVERED ITEM OR SERVICE: HCPCS | Performed by: NURSE PRACTITIONER

## 2017-10-30 PROCEDURE — 770006 HCHG ROOM/CARE - MED/SURG/GYN SEMI*

## 2017-10-30 PROCEDURE — 80053 COMPREHEN METABOLIC PANEL: CPT

## 2017-10-30 PROCEDURE — 700105 HCHG RX REV CODE 258: Performed by: SURGERY

## 2017-10-30 PROCEDURE — 82962 GLUCOSE BLOOD TEST: CPT | Mod: 91

## 2017-10-30 PROCEDURE — 99233 SBSQ HOSP IP/OBS HIGH 50: CPT | Performed by: SURGERY

## 2017-10-30 RX ORDER — METFORMIN HYDROCHLORIDE 500 MG/1
1000 TABLET, EXTENDED RELEASE ORAL 2 TIMES DAILY WITH MEALS
Status: DISCONTINUED | OUTPATIENT
Start: 2017-10-30 | End: 2017-10-30

## 2017-10-30 RX ORDER — METFORMIN HYDROCHLORIDE 500 MG/1
500 TABLET, EXTENDED RELEASE ORAL 2 TIMES DAILY WITH MEALS
Status: DISCONTINUED | OUTPATIENT
Start: 2017-10-30 | End: 2017-11-08 | Stop reason: HOSPADM

## 2017-10-30 RX ORDER — LEVOTHYROXINE SODIUM 0.03 MG/1
75 TABLET ORAL
Status: DISCONTINUED | OUTPATIENT
Start: 2017-10-30 | End: 2017-11-08 | Stop reason: HOSPADM

## 2017-10-30 RX ORDER — SIMVASTATIN 10 MG
20 TABLET ORAL
Status: DISCONTINUED | OUTPATIENT
Start: 2017-10-30 | End: 2017-11-08 | Stop reason: HOSPADM

## 2017-10-30 RX ADMIN — SODIUM CHLORIDE, POTASSIUM CHLORIDE, SODIUM LACTATE AND CALCIUM CHLORIDE: 600; 310; 30; 20 INJECTION, SOLUTION INTRAVENOUS at 03:16

## 2017-10-30 RX ADMIN — OXYCODONE HYDROCHLORIDE 10 MG: 10 TABLET ORAL at 09:03

## 2017-10-30 RX ADMIN — DIBASIC SODIUM PHOSPHATE, MONOBASIC POTASSIUM PHOSPHATE AND MONOBASIC SODIUM PHOSPHATE 2 TABLET: 852; 155; 130 TABLET ORAL at 09:03

## 2017-10-30 RX ADMIN — POLYETHYLENE GLYCOL 3350 1 PACKET: 17 POWDER, FOR SOLUTION ORAL at 20:18

## 2017-10-30 RX ADMIN — METFORMIN HYDROCHLORIDE 500 MG: 500 TABLET, EXTENDED RELEASE ORAL at 17:48

## 2017-10-30 RX ADMIN — DIBASIC SODIUM PHOSPHATE, MONOBASIC POTASSIUM PHOSPHATE AND MONOBASIC SODIUM PHOSPHATE 2 TABLET: 852; 155; 130 TABLET ORAL at 15:27

## 2017-10-30 RX ADMIN — ENOXAPARIN SODIUM 30 MG: 100 INJECTION SUBCUTANEOUS at 09:03

## 2017-10-30 RX ADMIN — DOCUSATE SODIUM 100 MG: 100 CAPSULE ORAL at 20:16

## 2017-10-30 RX ADMIN — LEVOTHYROXINE SODIUM 75 MCG: 75 TABLET ORAL at 12:06

## 2017-10-30 RX ADMIN — SIMVASTATIN 20 MG: 10 TABLET, FILM COATED ORAL at 20:16

## 2017-10-30 RX ADMIN — ENOXAPARIN SODIUM 30 MG: 100 INJECTION SUBCUTANEOUS at 20:16

## 2017-10-30 RX ADMIN — DIBASIC SODIUM PHOSPHATE, MONOBASIC POTASSIUM PHOSPHATE AND MONOBASIC SODIUM PHOSPHATE 2 TABLET: 852; 155; 130 TABLET ORAL at 20:33

## 2017-10-30 RX ADMIN — STANDARDIZED SENNA CONCENTRATE AND DOCUSATE SODIUM 1 TABLET: 8.6; 5 TABLET, FILM COATED ORAL at 20:16

## 2017-10-30 RX ADMIN — Medication: at 00:56

## 2017-10-30 RX ADMIN — OXYCODONE HYDROCHLORIDE 10 MG: 10 TABLET ORAL at 18:56

## 2017-10-30 RX ADMIN — Medication: at 20:18

## 2017-10-30 RX ADMIN — MAGNESIUM HYDROXIDE 30 ML: 400 SUSPENSION ORAL at 09:03

## 2017-10-30 RX ADMIN — POLYETHYLENE GLYCOL 3350 1 PACKET: 17 POWDER, FOR SOLUTION ORAL at 09:03

## 2017-10-30 RX ADMIN — DOCUSATE SODIUM 100 MG: 100 CAPSULE ORAL at 09:03

## 2017-10-30 RX ADMIN — OXYCODONE HYDROCHLORIDE 10 MG: 10 TABLET ORAL at 04:01

## 2017-10-30 RX ADMIN — OXYCODONE HYDROCHLORIDE 10 MG: 10 TABLET ORAL at 15:28

## 2017-10-30 ASSESSMENT — PAIN SCALES - GENERAL
PAINLEVEL_OUTOF10: 2
PAINLEVEL_OUTOF10: 2
PAINLEVEL_OUTOF10: 4
PAINLEVEL_OUTOF10: 2
PAINLEVEL_OUTOF10: 5
PAINLEVEL_OUTOF10: 2
PAINLEVEL_OUTOF10: 4
PAINLEVEL_OUTOF10: 5
PAINLEVEL_OUTOF10: 2
PAINLEVEL_OUTOF10: 5

## 2017-10-30 ASSESSMENT — ENCOUNTER SYMPTOMS
RESPIRATORY NEGATIVE: 1
MYALGIAS: 1
EYES NEGATIVE: 1
ABDOMINAL PAIN: 1
NAUSEA: 0
ROS GI COMMENTS: BM PRIOR TO ADMISSION
CARDIOVASCULAR NEGATIVE: 1
HEADACHES: 0
FEVER: 0
VOMITING: 0

## 2017-10-30 ASSESSMENT — PATIENT HEALTH QUESTIONNAIRE - PHQ9
SUM OF ALL RESPONSES TO PHQ QUESTIONS 1-9: 0
SUM OF ALL RESPONSES TO PHQ9 QUESTIONS 1 AND 2: 0
1. LITTLE INTEREST OR PLEASURE IN DOING THINGS: NOT AT ALL
2. FEELING DOWN, DEPRESSED, IRRITABLE, OR HOPELESS: NOT AT ALL

## 2017-10-30 ASSESSMENT — LIFESTYLE VARIABLES: DO YOU DRINK ALCOHOL: NO

## 2017-10-30 NOTE — CARE PLAN
Problem: Discharge Barriers/Planning  Goal: Patient's continuum of care needs will be met  Plan to transfer pt to floor today    Problem: Pain Management  Goal: Pain level will decrease to patient's comfort goal  Spoke with pt about pain medication regimen this AM, pt states current regimen is adequate

## 2017-10-30 NOTE — PROGRESS NOTES
"  Trauma/Surgical Progress Note    Author: David Londono / Apolinar Pan MD Date & Time created: 10/30/2017   11:41 AM     Interval Events:    Tertiary exam completed.  Adequate pain control.  Continue mcknight. Bladder rupture.  DC Lantus and resume prehospital metformin. Continue ISS.  PT/OT eval.  Clinically stable at this time, transfer to chan  Disposition: Skilled referral placed  Counseled     Review of Systems   Constitutional: Negative for fever.   Eyes: Negative.    Respiratory: Negative.    Cardiovascular: Negative.  Negative for chest pain.   Gastrointestinal: Positive for abdominal pain. Negative for nausea and vomiting.        BM prior to admission   Genitourinary: Negative.    Musculoskeletal: Positive for joint pain and myalgias.   Skin: Negative for rash.   Neurological: Negative for headaches.     Hemodynamics:  Blood pressure 107/53, pulse 78, temperature 37.2 °C (99 °F), resp. rate 17, height 1.499 m (4' 11\"), weight 76.7 kg (169 lb 1.5 oz), SpO2 94 %, not currently breastfeeding.     Respiratory:    Respiration: 17, Pulse Oximetry: 94 %     Work Of Breathing / Effort: Mild  RUL Breath Sounds: Clear, RML Breath Sounds: Clear, RLL Breath Sounds: Clear;Diminished, KEYUR Breath Sounds: Clear, LLL Breath Sounds: Clear;Diminished  Fluids:    Intake/Output Summary (Last 24 hours) at 10/30/17 1141  Last data filed at 10/30/17 0800   Gross per 24 hour   Intake             2800 ml   Output             3700 ml   Net             -900 ml     Admit Weight: 68.1 kg (150 lb 2.1 oz)  Current Weight: 76.7 kg (169 lb 1.5 oz)    Physical Exam   Constitutional: She is oriented to person, place, and time. Vital signs are normal. She appears well-developed. She is sleeping. She does not appear ill. No distress. Nasal cannula in place.   HENT:   Head: Normocephalic and atraumatic.   Right Ear: Hearing normal.   Left Ear: Hearing normal.   Nose: Nose normal.   Eyes: Conjunctivae are normal.   Neck: Trachea normal. Neck " supple. No JVD present.   Cardiovascular: Normal rate, intact distal pulses and normal pulses.    Pulmonary/Chest: Breath sounds normal. No accessory muscle usage. No respiratory distress. She exhibits no tenderness.   Abdominal: Soft. Normal appearance. She exhibits no distension. There is tenderness in the suprapubic area.   Genitourinary:   Genitourinary Comments: Parr to gravity   Musculoskeletal: She exhibits tenderness (Pelvis and bilateral lower extremities).   Dressing to bilateral lower extremities in place.   Neurological: She is alert and oriented to person, place, and time. GCS eye subscore is 4. GCS verbal subscore is 5. GCS motor subscore is 6.   Skin: Skin is warm and dry.   Psychiatric: She has a normal mood and affect. Her speech is normal and behavior is normal.   Nursing note and vitals reviewed.      Medical Decision Making/Problem List:    Active Hospital Problems    Diagnosis   • Multiple fractures of pelvis (CMS-HCC) [S32.810A]     Priority: High     Bilateral superior and inferior pubic rami fractures. Displaced on the left.   There is likely fracture of at least the right sacral alar.   Possible fracture of the right posterior acetabulum.  10/26 - Percutaneous screw fixation  Weight bearing status - TTWB BLE.    Emory Bajwa MD. Orthopedic surgery     • Anemia associated with acute blood loss [D62]     Priority: Medium     10/28 -Transfuse 1 unit PRBC.   - Iron replacement per pharmacy kinetics  Transfuse 1 unit PRBC if Hb < 7.0.     • Closed nondisplaced fracture of lateral malleolus of left fibula [S82.65XA]     Priority: Medium     Minimally displaced  Small adjacent puncture wound.  10/26 - ORIF  Weight bearing status - TTWB.  Emory Bajwa MD. Orthopedic Surgery     • Type 2 diabetes mellitus (CMS-HCC) [E11.9]     Priority: Medium     Chronic condition treated with metformin 1000 mg twice a day..  10/29 - HbA1C - 6.0 (126)  10/30 DC Lantus. Resume maintenance medication at 500 mg  twice a day. Continue High ISS and serial BS..  Adequate glycemic control      • Closed fracture of right femur (CMS-HCC) [S72.91XA]     Priority: Medium      Comminuted fractures of the proximal and distal femur, right .  10/26 - ORIF  Weight bearing status - TTWB BLE.  Emory Bajwa MD. Orthopedic surgery     • Spleen laceration [S36.039A]     Priority: Medium     Small grade 1 laceration, lateral inferior aspect of the spleen.  Trend abdominal exam and laboratory studies     • Traumatic rupture of bladder [S37.29XA]     Priority: Medium     Intra-peritoneal rupture  10/25 - Layered closure  Parr to remain in place for 10 days  Cystogram prior to removal  Trino Tsang MD. Trauma surgery     • Hypothyroid [E03.9]     Priority: Low     Chronic condition treated with synthroid.  Resume maintenance medication.       • Hyperlipidemia [E78.5]     Priority: Low     Chronic condition treated with simvastatin.  Resume maintenance medication.       • No contraindication to deep vein thrombosis (DVT) prophylaxis [Z78.9]     Priority: Low     Systemic anticoagulation contraindicated secondary to elevated bleeding risk.  10/29 - Duplex LE study negative   10/30 - Initiate pharmacological DVT prophylaxis     • HTN (hypertension) [I10]     Priority: Low     Chronic condition treated with lisinopril.  Adequate BP control off of maintenance medication.       Core Measures & Quality Metrics:  Labs reviewed, Medications reviewed and Radiology images reviewed  Parr catheter: Urologic Surgery or Other Surgery on Contiguous Structures of the Genitourinary Tract or Studies (bladder rupture)      DVT Prophylaxis: Enoxaparin (Lovenox)    Ulcer prophylaxis: Not indicated    Assessed for rehab: Patient was assess for and/or received rehabilitation services during this hospitalization    Total Score: 10    Discussed patient condition with RN, Patient and trauma surgery, Dr. Apolinar Pan.    Patient seen, data reviewed and discussed.  Agree  with assessment and plan.  TRESSA

## 2017-10-30 NOTE — CARE PLAN
Problem: Urinary Elimination:  Goal: Ability to reestablish a normal urinary elimination pattern will improve  Mcknight in place per MD order d/t bladder injury. Patient to obtain cystogram per MD prior to d/c of mcknight. Mcknight care given to prevent CAUTI.     Problem: Venous Thromboembolism (VTW)/Deep Vein Thrombosis (DVT) Prevention:  Goal: Patient will participate in Venous Thrombosis (VTE)/Deep Vein Thrombosis (DVT)Prevention Measures  SCDs in place and working properly. Lovenox to be given per MD order. Monitoring extremities for s/s DVTs. Reviewed BLE venous duplex results.     Problem: Pain Management  Goal: Pain level will decrease to patient's comfort goal  Multimodal approach to pain in place via pain meds per MAR and comfort measures. Patient self reports pain using 0-10 pain scale. Established comfort level and tolerance level. Discussed side effects of pain medications.     Problem: Skin Integrity  Goal: Risk for impaired skin integrity will decrease  Pressure ulcer prevention q2h turns with pillows in place and repositioning medical devices with turns. Elevating pressure areas. Monitoring skin integrity and skin care/dressing change provided per MD order.     Problem: Fluid Volume:  Goal: Will maintain balanced intake and output  Monitoring strict I&O and daily weights     Problem: Mobility  Goal: Risk for activity intolerance will decrease  Increasing activity as tolerated. Patient tolerates mobility to the edge of the bed. Toe touch weight bearing status to BLE, discussed rationale with patient. Pain meds and rest periods provided.

## 2017-10-30 NOTE — PROGRESS NOTES
"  Trauma/Surgical Progress Note    Author: Apolinar Pan Date & Time created: 10/29/2017   7:52 PM     Interval Events:    Hb stable - slight rise  BLE duplex negative  Electrolyte replacement completed  Iron replacement today  Good glucose contreol  Start therapies tomorrow    Review of Systems   Constitutional: Negative.    HENT: Negative.    Eyes: Negative.    Respiratory: Negative.    Cardiovascular: Negative.  Negative for chest pain.   Gastrointestinal: Negative for nausea and vomiting.   Genitourinary: Negative.    Musculoskeletal: Positive for joint pain and myalgias.     Hemodynamics:  Blood pressure 107/53, pulse 88, temperature 37.6 °C (99.7 °F), resp. rate 16, height 1.499 m (4' 11\"), weight 78.6 kg (173 lb 4.5 oz), SpO2 97 %, not currently breastfeeding.     Respiratory:    Respiration: 16, Pulse Oximetry: 97 %     Work Of Breathing / Effort: Mild  RUL Breath Sounds: Clear, RML Breath Sounds: Clear, RLL Breath Sounds: Clear;Diminished, KEYUR Breath Sounds: Clear, LLL Breath Sounds: Clear;Diminished  Fluids:    Intake/Output Summary (Last 24 hours) at 10/29/17 1952  Last data filed at 10/29/17 1800   Gross per 24 hour   Intake             1850 ml   Output             3565 ml   Net            -1715 ml     Admit Weight: 68.1 kg (150 lb 2.1 oz)  Current Weight: 78.6 kg (173 lb 4.5 oz)    Physical Exam   Constitutional: She is oriented to person, place, and time. Vital signs are normal. She appears well-developed. She is sleeping. She does not appear ill. No distress. Nasal cannula in place.   HENT:   Head: Normocephalic and atraumatic.   Right Ear: Hearing normal.   Left Ear: Hearing normal.   Nose: Nose normal.   Eyes: Conjunctivae and EOM are normal. Pupils are equal, round, and reactive to light.   Neck: Trachea normal. No JVD present.   Cardiovascular: Normal rate, regular rhythm, intact distal pulses and normal pulses.    Pulmonary/Chest: Breath sounds normal. Accessory muscle usage present. No " stridor. No respiratory distress. She exhibits no tenderness.   Abdominal: Soft. Normal appearance. She exhibits no distension. There is tenderness in the suprapubic area.   Genitourinary:   Genitourinary Comments: Voiding   Musculoskeletal: She exhibits tenderness (left leg / right thigh).   Neurological: She is alert and oriented to person, place, and time. GCS eye subscore is 4. GCS verbal subscore is 5. GCS motor subscore is 6.   Skin: Skin is warm and dry.   Psychiatric: She has a normal mood and affect. Her speech is normal and behavior is normal.   Nursing note and vitals reviewed.      Medical Decision Making/Problem List:    Active Hospital Problems    Diagnosis   • Anemia associated with acute blood loss [D62]     Priority: High     10/28 - transfuse 1 unit PRBC  Transfuse 1 unit PRBC if Hb < 7.0  10/28 - Iron studies low - replace per protocol.  10/29 - Hb 8.2     • Hypophosphatemia [E83.39]     Priority: Medium     Phos low - replace with IV and po - follow.       • Contraindication to deep vein thrombosis (DVT) prophylaxis [Z53.09]     Priority: Medium     Systemic anticoagulation contraindicated secondary to elevated bleeding risk.  10/29 - Duplex LE study negative     • Closed nondisplaced fracture of lateral malleolus of left fibula [S82.65XA]     Priority: Medium     Minimally displaced  Small adjacent puncture wound.  10/26 - ORIF  Weight bearing status - TTWB.  Emory Bajwa MD       • Type 2 diabetes mellitus (CMS-HCC) [E11.9]     Priority: Medium     High ISS and serial BS ordered  10/29 - HbA1C - 6.0 (126)     • Closed fracture of right femur (CMS-HCC) [S72.91XA]     Priority: Medium      Comminuted fractures of the proximal and distal femur, right .  10/26 - ORIF  Weight bearing status - TTWB BLE.  Ortho - Emory Bajwa MD     • Multiple fractures of pelvis (CMS-HCC) [S32.810A]     Priority: Medium     Bilateral superior and inferior pubic rami fractures. Displaced on the left.   There is  likely fracture of at least the right sacral alar.   Possible fracture of the right posterior acetabulum.  10/26 - Percutaneous screw fixation  Weight bearing status - TTWB BLE.  Ortho - Emory Bajwa MD       • Spleen laceration [S36.039A]     Priority: Medium     Small grade 1 laceration, lateral inferior aspect of the spleen.  Trend hemoglobin     • Traumatic rupture of bladder [S37.29XA]     Priority: Medium     Intra-peritoneal rupture  10/25 - Layered closure  Parr to remain in place for 10 days  Cystogram prior to removal  Surgeon - JARROD Tsang MD     • Hypomagnesemia [E83.42]     Priority: Low     10/27 - Magnesium low - replace and follow.     • HTN (hypertension) [I10]     Priority: Low     Home medication verification per pharmacy.        Core Measures & Quality Metrics:  Radiology images reviewed, Labs reviewed and Medications reviewed  Parr catheter: Critically Ill - Requiring Accurate Measurement of Urinary Output and Urologic Surgery or Other Surgery on Contiguous Structures of the Genitourinary Tract or Studies      DVT Prophylaxis: Contraindicated - High bleeding risk  DVT prophylaxis - mechanical: SCDs  Ulcer prophylaxis: Not indicated        Total Score: 0    Discussed patient condition with RN, RT, Therapies, Pharmacy, Patient and orthopedics.  CRITICAL CARE TIME EXCLUDING PROCEDURES: 37  minutes

## 2017-10-30 NOTE — PROGRESS NOTES
"   Orthopaedic PA Progress Note    Interval changes:Continued SICU care, doing well from Ortho standpoint    ROS - Patient denies any new issues. No chest pain, dyspnea, or fever.  Pain well controlled.    Blood pressure 107/53, pulse 78, temperature 37.2 °C (99 °F), resp. rate 17, height 1.499 m (4' 11\"), weight 76.7 kg (169 lb 1.5 oz), SpO2 94 %, not currently breastfeeding.    Patient seen and examined  No acute distress  Breathing non labored  RRR  Bilateral surgical dressings clean, dry, and intact. Patient clearly fires tibialis anterior, EHL, and gastrocnemius/soleus. Sensation is intact to light touch throughout superficial peroneal, deep peroneal, tibial, saphenous, and sural nerve distributions. Strong and palpable 2+ dorsalis pedis and posterior tibial pulses with capillary refill less than 2 seconds. No lower leg tenderness or discomfort.    Recent Labs      10/29/17   0535  10/29/17   1707  10/30/17   0539   WBC  5.9  5.7  5.8   RBC  2.68*  2.77*  2.75*   HEMOGLOBIN  8.1*  8.2*  8.3*   HEMATOCRIT  24.0*  24.8*  25.0*   MCV  89.6  89.5  90.9   MCH  30.2  29.6  30.2   MCHC  33.8  33.1*  33.2*   RDW  49.6  49.5  49.5   PLATELETCT  139*  168  180   MPV  9.4  9.1  9.2       Active Hospital Problems    Diagnosis   • Anemia associated with acute blood loss [D62]     Priority: High     10/28 - transfuse 1 unit PRBC  Transfuse 1 unit PRBC if Hb < 7.0  10/28 - Iron studies low - replace per protocol.  10/29 - Hb 8.2     • Hypophosphatemia [E83.39]     Priority: Medium     Phos low - replace with IV and po - follow.       • Contraindication to deep vein thrombosis (DVT) prophylaxis [Z53.09]     Priority: Medium     Systemic anticoagulation contraindicated secondary to elevated bleeding risk.  10/29 - Duplex LE study negative     • Closed nondisplaced fracture of lateral malleolus of left fibula [S82.65XA]     Priority: Medium     Minimally displaced  Small adjacent puncture wound.  10/26 - ORIF  Weight bearing " status - TTWB.  Emory Bajwa MD       • Type 2 diabetes mellitus (CMS-HCC) [E11.9]     Priority: Medium     High ISS and serial BS ordered  10/29 - HbA1C - 6.0 (126)     • Closed fracture of right femur (CMS-HCC) [S72.91XA]     Priority: Medium      Comminuted fractures of the proximal and distal femur, right .  10/26 - ORIF  Weight bearing status - TTWB BLE.  Berny Bajwa MD     • Multiple fractures of pelvis (CMS-HCC) [S32.810A]     Priority: Medium     Bilateral superior and inferior pubic rami fractures. Displaced on the left.   There is likely fracture of at least the right sacral alar.   Possible fracture of the right posterior acetabulum.  10/26 - Percutaneous screw fixation  Weight bearing status - TTWB BLE.  Berny Bajwa MD       • Spleen laceration [S36.039A]     Priority: Medium     Small grade 1 laceration, lateral inferior aspect of the spleen.  Trend hemoglobin     • Traumatic rupture of bladder [S37.29XA]     Priority: Medium     Intra-peritoneal rupture  10/25 - Layered closure  Parr to remain in place for 10 days  Cystogram prior to removal  Surgeon - JARROD Tsang MD     • Hypomagnesemia [E83.42]     Priority: Low     10/27 - Magnesium low - replace and follow.     • HTN (hypertension) [I10]     Priority: Low     Home medication verification per pharmacy.          Assessment/Plan:  POD#3 S/P ORIF R prox femur (Hip nail), R distal femur (plate/screw fix), and L ankle.  Wt bearing status - TTWB BLE  PT/OT-initiate when ok with trauma  Wound care: Dressing change tomorrow  Drains - out  Parr-per trauma  Sutures/Staples out- 10-14 days post operatively  Antibiotics: complete  DVT Prophylaxis- TEDS/SCDs/Foot pumps.   Lovenox: 30 mg SQ q12h, Duration-until ambulatory > 150'  Future Procedures - not anticipated  Case Coordination for Discharge Planning - Disposition per trauma, will folow

## 2017-10-30 NOTE — CARE PLAN
Problem: Nutritional:  Goal: Achieve adequate nutritional intake  Patient will consume 50% of meals and supplements  Outcome: NOT MET
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
## 2017-10-31 LAB
ALBUMIN SERPL BCP-MCNC: 2.1 G/DL (ref 3.2–4.9)
ALBUMIN/GLOB SERPL: 0.8 G/DL
ALP SERPL-CCNC: 45 U/L (ref 30–99)
ALT SERPL-CCNC: 18 U/L (ref 2–50)
ANION GAP SERPL CALC-SCNC: 5 MMOL/L (ref 0–11.9)
ANISOCYTOSIS BLD QL SMEAR: ABNORMAL
AST SERPL-CCNC: 30 U/L (ref 12–45)
BASOPHILS # BLD AUTO: 0 % (ref 0–1.8)
BASOPHILS # BLD: 0 K/UL (ref 0–0.12)
BILIRUB SERPL-MCNC: 1.4 MG/DL (ref 0.1–1.5)
BUN SERPL-MCNC: 9 MG/DL (ref 8–22)
CALCIUM SERPL-MCNC: 7.9 MG/DL (ref 8.5–10.5)
CHLORIDE SERPL-SCNC: 103 MMOL/L (ref 96–112)
CO2 SERPL-SCNC: 30 MMOL/L (ref 20–33)
CREAT SERPL-MCNC: 0.29 MG/DL (ref 0.5–1.4)
EOSINOPHIL # BLD AUTO: 0.1 K/UL (ref 0–0.51)
EOSINOPHIL NFR BLD: 1.7 % (ref 0–6.9)
ERYTHROCYTE [DISTWIDTH] IN BLOOD BY AUTOMATED COUNT: 47.8 FL (ref 35.9–50)
GFR SERPL CREATININE-BSD FRML MDRD: >60 ML/MIN/1.73 M 2
GLOBULIN SER CALC-MCNC: 2.8 G/DL (ref 1.9–3.5)
GLUCOSE BLD-MCNC: 117 MG/DL (ref 65–99)
GLUCOSE BLD-MCNC: 133 MG/DL (ref 65–99)
GLUCOSE BLD-MCNC: 138 MG/DL (ref 65–99)
GLUCOSE BLD-MCNC: 138 MG/DL (ref 65–99)
GLUCOSE SERPL-MCNC: 121 MG/DL (ref 65–99)
HCT VFR BLD AUTO: 26.9 % (ref 37–47)
HGB BLD-MCNC: 8.6 G/DL (ref 12–16)
LYMPHOCYTES # BLD AUTO: 1.1 K/UL (ref 1–4.8)
LYMPHOCYTES NFR BLD: 18.3 % (ref 22–41)
MACROCYTES BLD QL SMEAR: ABNORMAL
MAGNESIUM SERPL-MCNC: 1.8 MG/DL (ref 1.5–2.5)
MANUAL DIFF BLD: NORMAL
MCH RBC QN AUTO: 29.1 PG (ref 27–33)
MCHC RBC AUTO-ENTMCNC: 32 G/DL (ref 33.6–35)
MCV RBC AUTO: 90.9 FL (ref 81.4–97.8)
METAMYELOCYTES NFR BLD MANUAL: 2.6 %
MICROCYTES BLD QL SMEAR: ABNORMAL
MONOCYTES # BLD AUTO: 0.26 K/UL (ref 0–0.85)
MONOCYTES NFR BLD AUTO: 4.4 % (ref 0–13.4)
MORPHOLOGY BLD-IMP: NORMAL
MYELOCYTES NFR BLD MANUAL: 1.7 %
NEUTROPHILS # BLD AUTO: 4.22 K/UL (ref 2–7.15)
NEUTROPHILS NFR BLD: 66.1 % (ref 44–72)
NEUTS BAND NFR BLD MANUAL: 4.3 % (ref 0–10)
NRBC # BLD AUTO: 0.13 K/UL
NRBC BLD AUTO-RTO: 2.2 /100 WBC
PHOSPHATE SERPL-MCNC: 3.7 MG/DL (ref 2.5–4.5)
PLATELET # BLD AUTO: 229 K/UL (ref 164–446)
PLATELET BLD QL SMEAR: NORMAL
PMV BLD AUTO: 9 FL (ref 9–12.9)
POLYCHROMASIA BLD QL SMEAR: NORMAL
POTASSIUM SERPL-SCNC: 4 MMOL/L (ref 3.6–5.5)
PROMYELOCYTES NFR BLD MANUAL: 0.9 %
PROT SERPL-MCNC: 4.9 G/DL (ref 6–8.2)
RBC # BLD AUTO: 2.96 M/UL (ref 4.2–5.4)
RBC BLD AUTO: PRESENT
SODIUM SERPL-SCNC: 138 MMOL/L (ref 135–145)
WBC # BLD AUTO: 6 K/UL (ref 4.8–10.8)

## 2017-10-31 PROCEDURE — 85007 BL SMEAR W/DIFF WBC COUNT: CPT

## 2017-10-31 PROCEDURE — 700102 HCHG RX REV CODE 250 W/ 637 OVERRIDE(OP): Performed by: SURGERY

## 2017-10-31 PROCEDURE — 84100 ASSAY OF PHOSPHORUS: CPT

## 2017-10-31 PROCEDURE — A9270 NON-COVERED ITEM OR SERVICE: HCPCS | Performed by: SURGERY

## 2017-10-31 PROCEDURE — 97530 THERAPEUTIC ACTIVITIES: CPT

## 2017-10-31 PROCEDURE — 36415 COLL VENOUS BLD VENIPUNCTURE: CPT

## 2017-10-31 PROCEDURE — 97535 SELF CARE MNGMENT TRAINING: CPT

## 2017-10-31 PROCEDURE — 700111 HCHG RX REV CODE 636 W/ 250 OVERRIDE (IP): Performed by: SURGERY

## 2017-10-31 PROCEDURE — 700112 HCHG RX REV CODE 229: Performed by: SURGERY

## 2017-10-31 PROCEDURE — 700102 HCHG RX REV CODE 250 W/ 637 OVERRIDE(OP): Performed by: NURSE PRACTITIONER

## 2017-10-31 PROCEDURE — 80053 COMPREHEN METABOLIC PANEL: CPT

## 2017-10-31 PROCEDURE — 85027 COMPLETE CBC AUTOMATED: CPT

## 2017-10-31 PROCEDURE — A9270 NON-COVERED ITEM OR SERVICE: HCPCS | Performed by: NURSE PRACTITIONER

## 2017-10-31 PROCEDURE — 83735 ASSAY OF MAGNESIUM: CPT

## 2017-10-31 PROCEDURE — 770006 HCHG ROOM/CARE - MED/SURG/GYN SEMI*

## 2017-10-31 PROCEDURE — 82962 GLUCOSE BLOOD TEST: CPT | Mod: 91

## 2017-10-31 RX ADMIN — OXYCODONE HYDROCHLORIDE 10 MG: 10 TABLET ORAL at 08:37

## 2017-10-31 RX ADMIN — OXYCODONE HYDROCHLORIDE 10 MG: 10 TABLET ORAL at 05:00

## 2017-10-31 RX ADMIN — DIBASIC SODIUM PHOSPHATE, MONOBASIC POTASSIUM PHOSPHATE AND MONOBASIC SODIUM PHOSPHATE 2 TABLET: 852; 155; 130 TABLET ORAL at 08:36

## 2017-10-31 RX ADMIN — OXYCODONE HYDROCHLORIDE 10 MG: 10 TABLET ORAL at 00:23

## 2017-10-31 RX ADMIN — LEVOTHYROXINE SODIUM 75 MCG: 75 TABLET ORAL at 06:16

## 2017-10-31 RX ADMIN — POLYETHYLENE GLYCOL 3350 1 PACKET: 17 POWDER, FOR SOLUTION ORAL at 21:37

## 2017-10-31 RX ADMIN — MAGNESIUM HYDROXIDE 30 ML: 400 SUSPENSION ORAL at 08:36

## 2017-10-31 RX ADMIN — SIMVASTATIN 20 MG: 10 TABLET, FILM COATED ORAL at 21:49

## 2017-10-31 RX ADMIN — OXYCODONE HYDROCHLORIDE 10 MG: 10 TABLET ORAL at 17:36

## 2017-10-31 RX ADMIN — ENOXAPARIN SODIUM 30 MG: 100 INJECTION SUBCUTANEOUS at 08:37

## 2017-10-31 RX ADMIN — DIBASIC SODIUM PHOSPHATE, MONOBASIC POTASSIUM PHOSPHATE AND MONOBASIC SODIUM PHOSPHATE 2 TABLET: 852; 155; 130 TABLET ORAL at 21:50

## 2017-10-31 RX ADMIN — STANDARDIZED SENNA CONCENTRATE AND DOCUSATE SODIUM 1 TABLET: 8.6; 5 TABLET, FILM COATED ORAL at 21:37

## 2017-10-31 RX ADMIN — DIBASIC SODIUM PHOSPHATE, MONOBASIC POTASSIUM PHOSPHATE AND MONOBASIC SODIUM PHOSPHATE 2 TABLET: 852; 155; 130 TABLET ORAL at 14:04

## 2017-10-31 RX ADMIN — METFORMIN HYDROCHLORIDE 500 MG: 500 TABLET, EXTENDED RELEASE ORAL at 10:31

## 2017-10-31 RX ADMIN — METFORMIN HYDROCHLORIDE 500 MG: 500 TABLET, EXTENDED RELEASE ORAL at 17:32

## 2017-10-31 RX ADMIN — OXYCODONE HYDROCHLORIDE 10 MG: 10 TABLET ORAL at 21:37

## 2017-10-31 RX ADMIN — POLYETHYLENE GLYCOL 3350 1 PACKET: 17 POWDER, FOR SOLUTION ORAL at 08:37

## 2017-10-31 RX ADMIN — DOCUSATE SODIUM 100 MG: 100 CAPSULE ORAL at 21:37

## 2017-10-31 RX ADMIN — ENOXAPARIN SODIUM 30 MG: 100 INJECTION SUBCUTANEOUS at 21:37

## 2017-10-31 RX ADMIN — DOCUSATE SODIUM 100 MG: 100 CAPSULE ORAL at 08:36

## 2017-10-31 ASSESSMENT — COGNITIVE AND FUNCTIONAL STATUS - GENERAL
PERSONAL GROOMING: A LITTLE
DAILY ACTIVITIY SCORE: 13
WALKING IN HOSPITAL ROOM: TOTAL
MOVING FROM LYING ON BACK TO SITTING ON SIDE OF FLAT BED: UNABLE
CLIMB 3 TO 5 STEPS WITH RAILING: TOTAL
SUGGESTED CMS G CODE MODIFIER DAILY ACTIVITY: CL
STANDING UP FROM CHAIR USING ARMS: TOTAL
MOVING TO AND FROM BED TO CHAIR: UNABLE
DRESSING REGULAR UPPER BODY CLOTHING: A LITTLE
MOBILITY SCORE: 6
EATING MEALS: A LITTLE
DRESSING REGULAR LOWER BODY CLOTHING: TOTAL
SUGGESTED CMS G CODE MODIFIER MOBILITY: CN
TURNING FROM BACK TO SIDE WHILE IN FLAT BAD: UNABLE
TOILETING: A LOT
HELP NEEDED FOR BATHING: TOTAL

## 2017-10-31 ASSESSMENT — ENCOUNTER SYMPTOMS
NAUSEA: 0
MYALGIAS: 1
NEUROLOGICAL NEGATIVE: 1
ABDOMINAL PAIN: 1
CONSTITUTIONAL NEGATIVE: 1
RESPIRATORY NEGATIVE: 1
PSYCHIATRIC NEGATIVE: 1
CARDIOVASCULAR NEGATIVE: 1
EYES NEGATIVE: 1
ROS GI COMMENTS: BM 10/31
VOMITING: 0

## 2017-10-31 ASSESSMENT — GAIT ASSESSMENTS: GAIT LEVEL OF ASSIST: UNABLE TO PARTICIPATE

## 2017-10-31 ASSESSMENT — PAIN SCALES - GENERAL
PAINLEVEL_OUTOF10: 0
PAINLEVEL_OUTOF10: 8
PAINLEVEL_OUTOF10: 8
PAINLEVEL_OUTOF10: 3

## 2017-10-31 ASSESSMENT — LIFESTYLE VARIABLES: DO YOU DRINK ALCOHOL: NO

## 2017-10-31 NOTE — PROGRESS NOTES
Pt A&O x's 4, VSS, blood sugars controled with PO medication, denies pain at this time. Trapeze ordered and placed for independent repositioning, pt tolerating sitting at edge of bed for meals. New hinged, unlocked brace applied to RLE, ace bandages CDI to BLE, pedal pulses palpable bilaterally.  Dressings to bilateral hips and midline abd incision CDI. Pt claims to have chest pain when taking deep breaths, no complaints of SOB or pain with no exertion. Pt's O2 saturation stable in the high 90's on 2 L. Family at bedside, call light and personal items within reach, pt calls appropriately. Will continue to monitor.

## 2017-10-31 NOTE — THERAPY
"Occupational Therapy Treatment completed with focus on ADLs, patient education and upper extremity function.  Functional Status:  Pt seen for OT tx today, demonstrating improved activity tolerance, bed mobility and level of alertness. Pt is now performing bed mobility with max a of 1, F- sitting balance during ADLs with unilateral UE support throughout the session needed.  Performed self-feeding tasks and h/g seated eob with cga/sba, UB dressing with min a, LB dressing max a. Pt would continue to benefit from acute skilled services while in house   Plan of Care: Will benefit from Occupational Therapy 3 times per week  Discharge Recommendations:  Equipment Will Continue to Assess for Equipment Needs. Post-acute therapy Discharge to a transitional care facility for continued skilled therapy services.    See \"Rehab Therapy-Acute\" Patient Summary Report for complete documentation.   "

## 2017-10-31 NOTE — PROGRESS NOTES
Hinged knee brace (unlocked) was delivered and fitted to patient RLE.  If any further assistance needed, please call extension [phone] or place order for Ortho Technician assistance as a communication order in [facility].

## 2017-10-31 NOTE — PROGRESS NOTES
"S:  Seen and examined.  s/p R femur ORIF, L medial malleolus ORIF and I&D, B posterior pelvic ring injury percutaneous fixation.  Doing well this morning.  No new complaints, pain controlled.    O: Blood pressure 112/85, pulse 85, temperature 36.9 °C (98.4 °F), resp. rate 16, height 1.499 m (4' 11\"), weight 76.7 kg (169 lb 1.5 oz), SpO2 95 %, not currently breastfeeding..      Intake/Output Summary (Last 24 hours) at 10/31/17 1317  Last data filed at 10/31/17 1300   Gross per 24 hour   Intake             1320 ml   Output             4650 ml   Net            -3330 ml   .    Bilateral lower extremities examined.  Sensation intact about foot bilaterally.  Toes warm, well-perfused.  Wounds all c/d/i.    Recent Labs      10/29/17   1707  10/30/17   0539  10/31/17   0510   WBC  5.7  5.8  6.0   RBC  2.77*  2.75*  2.96*   HEMOGLOBIN  8.2*  8.3*  8.6*   HEMATOCRIT  24.8*  25.0*  26.9*   MCV  89.5  90.9  90.9   MCH  29.6  30.2  29.1   MCHC  33.1*  33.2*  32.0*   RDW  49.5  49.5  47.8   PLATELETCT  168  180  229   MPV  9.1  9.2  9.0       A/P:    #1 Bilateral posterior pelvic ring injuries s/p SI screw placement  #2 Right subtrochanteric femur fracture s/p IM nail  #3 Right distal femur fx s/p ORIF  #4 Left open medial malleolus fracture s/p I&D, ORIF    Antibiotics:  NO additional for ortho required  Activity: TTWB BLE.  PT today or as able.  Knee ROM and ankle ROM.  Boot to left foot, hinged brace, unlocked, to right knee.  Diet: General  DVT: Mechanical (SCDs) + Pharmacologic (Lovenox, OK to begin from ortho perspective when OK per trauma)  Dispo: D/C planning    "

## 2017-10-31 NOTE — THERAPY
"Physical Therapy Treatment completed.   Bed Mobility:  Supine to Sit: Maximal Assist  Transfers: Sit to Stand: Unable to Participate  Gait: Level Of Assist: Unable to Participate     Plan of Care: Will benefit from Physical Therapy 3 times per week and Plan to complete next treatment by Thursday 11/2  Discharge Recommendations: Equipment: Will Continue to Assess for Equipment Needs. Post-acute therapy Discharge to a transitional care facility for continued skilled therapy services.    Pt continues to present with decreased functional mobility, decreased functional strength, difficulty with bed mobility and transfers and pain limiting function. Pt with heavy reliance on use of trapeze to assist with bed mobility. Pt able to bring trunk upright with trapeze but overall bed mobility still required maximal assist to bring B LE's to EOB And to assist with fully bringing trunk to erect position. Pt would benefit from ongoing PT intervention while in the acute care setting to address the listed deficits and improve mobility prior to DC. Next session will bring WC and slide board to progress mobility     See \"Rehab Therapy-Acute\" Patient Summary Report for complete documentation.       "

## 2017-10-31 NOTE — CARE PLAN
Problem: Discharge Barriers/Planning  Goal: Patient's continuum of care needs will be met  Rehab referral to be considered      Problem: Pain Management  Goal: Pain level will decrease to patient's comfort goal  Pt states that pain is being controlled        Problem: Skin Integrity  Goal: Risk for impaired skin integrity will decrease  Pressure ulcer prevention q2h turns with pillows in place and repositioning medical devices with turns. Elevating pressure areas. Monitoring skin integrity and skin care/dressing change provided per MD order. Overhead trapeze in place for independent positioning

## 2017-10-31 NOTE — CARE PLAN
Problem: Communication  Goal: The ability to communicate needs accurately and effectively will improve    Intervention: [facility] patient and significant other/support system to call light to alert staff of needs  Pt is Lithuanian speaking only but has English speaking family at bedside that help with communication. Pt is able to communicate her needs at this time.  services to be contacted if unable.       Problem: Safety  Goal: Will remain free from injury  Outcome: PROGRESSING AS EXPECTED  Bed in lowest position, bed rails x's 3 up, call light within reach, family at bedside   Goal: Will remain free from falls  Outcome: PROGRESSING AS EXPECTED      Problem: Infection  Goal: Will remain free from infection  Outcome: PROGRESSING AS EXPECTED

## 2017-11-01 LAB
ALBUMIN SERPL BCP-MCNC: 2.7 G/DL (ref 3.2–4.9)
ALBUMIN/GLOB SERPL: 1 G/DL
ALP SERPL-CCNC: 57 U/L (ref 30–99)
ALT SERPL-CCNC: 23 U/L (ref 2–50)
ANION GAP SERPL CALC-SCNC: 6 MMOL/L (ref 0–11.9)
ANISOCYTOSIS BLD QL SMEAR: ABNORMAL
AST SERPL-CCNC: 33 U/L (ref 12–45)
BASOPHILS # BLD AUTO: 0 % (ref 0–1.8)
BASOPHILS # BLD: 0 K/UL (ref 0–0.12)
BILIRUB SERPL-MCNC: 1.7 MG/DL (ref 0.1–1.5)
BUN SERPL-MCNC: 11 MG/DL (ref 8–22)
CALCIUM SERPL-MCNC: 8.1 MG/DL (ref 8.5–10.5)
CHLORIDE SERPL-SCNC: 103 MMOL/L (ref 96–112)
CO2 SERPL-SCNC: 28 MMOL/L (ref 20–33)
CREAT SERPL-MCNC: 0.38 MG/DL (ref 0.5–1.4)
EOSINOPHIL # BLD AUTO: 0.25 K/UL (ref 0–0.51)
EOSINOPHIL NFR BLD: 4.3 % (ref 0–6.9)
ERYTHROCYTE [DISTWIDTH] IN BLOOD BY AUTOMATED COUNT: 48.2 FL (ref 35.9–50)
GFR SERPL CREATININE-BSD FRML MDRD: >60 ML/MIN/1.73 M 2
GLOBULIN SER CALC-MCNC: 2.6 G/DL (ref 1.9–3.5)
GLUCOSE BLD-MCNC: 117 MG/DL (ref 65–99)
GLUCOSE BLD-MCNC: 137 MG/DL (ref 65–99)
GLUCOSE BLD-MCNC: 172 MG/DL (ref 65–99)
GLUCOSE SERPL-MCNC: 124 MG/DL (ref 65–99)
HCT VFR BLD AUTO: 29.1 % (ref 37–47)
HGB BLD-MCNC: 9.3 G/DL (ref 12–16)
LYMPHOCYTES # BLD AUTO: 1.7 K/UL (ref 1–4.8)
LYMPHOCYTES NFR BLD: 29.3 % (ref 22–41)
MACROCYTES BLD QL SMEAR: ABNORMAL
MAGNESIUM SERPL-MCNC: 1.8 MG/DL (ref 1.5–2.5)
MANUAL DIFF BLD: NORMAL
MCH RBC QN AUTO: 29.2 PG (ref 27–33)
MCHC RBC AUTO-ENTMCNC: 32 G/DL (ref 33.6–35)
MCV RBC AUTO: 91.5 FL (ref 81.4–97.8)
METAMYELOCYTES NFR BLD MANUAL: 1.7 %
MICROCYTES BLD QL SMEAR: ABNORMAL
MONOCYTES # BLD AUTO: 0.25 K/UL (ref 0–0.85)
MONOCYTES NFR BLD AUTO: 4.3 % (ref 0–13.4)
MORPHOLOGY BLD-IMP: NORMAL
MYELOCYTES NFR BLD MANUAL: 0.9 %
NEUTROPHILS # BLD AUTO: 3.45 K/UL (ref 2–7.15)
NEUTROPHILS NFR BLD: 55.2 % (ref 44–72)
NEUTS BAND NFR BLD MANUAL: 4.3 % (ref 0–10)
NRBC # BLD AUTO: 0.06 K/UL
NRBC BLD AUTO-RTO: 1 /100 WBC
OVALOCYTES BLD QL SMEAR: NORMAL
PHOSPHATE SERPL-MCNC: 3.5 MG/DL (ref 2.5–4.5)
PLATELET # BLD AUTO: 267 K/UL (ref 164–446)
PLATELET BLD QL SMEAR: NORMAL
PMV BLD AUTO: 8.8 FL (ref 9–12.9)
POIKILOCYTOSIS BLD QL SMEAR: NORMAL
POLYCHROMASIA BLD QL SMEAR: NORMAL
POTASSIUM SERPL-SCNC: 4 MMOL/L (ref 3.6–5.5)
PROT SERPL-MCNC: 5.3 G/DL (ref 6–8.2)
RBC # BLD AUTO: 3.18 M/UL (ref 4.2–5.4)
RBC BLD AUTO: PRESENT
SODIUM SERPL-SCNC: 137 MMOL/L (ref 135–145)
VARIANT LYMPHS BLD QL SMEAR: NORMAL
WBC # BLD AUTO: 5.8 K/UL (ref 4.8–10.8)

## 2017-11-01 PROCEDURE — 700102 HCHG RX REV CODE 250 W/ 637 OVERRIDE(OP): Performed by: SURGERY

## 2017-11-01 PROCEDURE — A9270 NON-COVERED ITEM OR SERVICE: HCPCS | Performed by: NURSE PRACTITIONER

## 2017-11-01 PROCEDURE — 770006 HCHG ROOM/CARE - MED/SURG/GYN SEMI*

## 2017-11-01 PROCEDURE — 83735 ASSAY OF MAGNESIUM: CPT

## 2017-11-01 PROCEDURE — A9270 NON-COVERED ITEM OR SERVICE: HCPCS | Performed by: SURGERY

## 2017-11-01 PROCEDURE — 700102 HCHG RX REV CODE 250 W/ 637 OVERRIDE(OP): Performed by: NURSE PRACTITIONER

## 2017-11-01 PROCEDURE — 84100 ASSAY OF PHOSPHORUS: CPT

## 2017-11-01 PROCEDURE — 82962 GLUCOSE BLOOD TEST: CPT

## 2017-11-01 PROCEDURE — 36415 COLL VENOUS BLD VENIPUNCTURE: CPT

## 2017-11-01 PROCEDURE — 700111 HCHG RX REV CODE 636 W/ 250 OVERRIDE (IP): Performed by: SURGERY

## 2017-11-01 PROCEDURE — 85007 BL SMEAR W/DIFF WBC COUNT: CPT

## 2017-11-01 PROCEDURE — 80053 COMPREHEN METABOLIC PANEL: CPT

## 2017-11-01 PROCEDURE — 85027 COMPLETE CBC AUTOMATED: CPT

## 2017-11-01 PROCEDURE — 700112 HCHG RX REV CODE 229: Performed by: SURGERY

## 2017-11-01 RX ADMIN — ENOXAPARIN SODIUM 30 MG: 100 INJECTION SUBCUTANEOUS at 09:03

## 2017-11-01 RX ADMIN — Medication: at 16:39

## 2017-11-01 RX ADMIN — DIBASIC SODIUM PHOSPHATE, MONOBASIC POTASSIUM PHOSPHATE AND MONOBASIC SODIUM PHOSPHATE 2 TABLET: 852; 155; 130 TABLET ORAL at 15:08

## 2017-11-01 RX ADMIN — OXYCODONE HYDROCHLORIDE 10 MG: 10 TABLET ORAL at 09:08

## 2017-11-01 RX ADMIN — POLYETHYLENE GLYCOL 3350 1 PACKET: 17 POWDER, FOR SOLUTION ORAL at 20:06

## 2017-11-01 RX ADMIN — OXYCODONE HYDROCHLORIDE 10 MG: 10 TABLET ORAL at 17:58

## 2017-11-01 RX ADMIN — OXYCODONE HYDROCHLORIDE 10 MG: 10 TABLET ORAL at 20:36

## 2017-11-01 RX ADMIN — METFORMIN HYDROCHLORIDE 500 MG: 500 TABLET, EXTENDED RELEASE ORAL at 09:03

## 2017-11-01 RX ADMIN — DOCUSATE SODIUM 100 MG: 100 CAPSULE ORAL at 09:03

## 2017-11-01 RX ADMIN — SIMVASTATIN 20 MG: 10 TABLET, FILM COATED ORAL at 20:06

## 2017-11-01 RX ADMIN — DIBASIC SODIUM PHOSPHATE, MONOBASIC POTASSIUM PHOSPHATE AND MONOBASIC SODIUM PHOSPHATE 2 TABLET: 852; 155; 130 TABLET ORAL at 20:06

## 2017-11-01 RX ADMIN — STANDARDIZED SENNA CONCENTRATE AND DOCUSATE SODIUM 1 TABLET: 8.6; 5 TABLET, FILM COATED ORAL at 20:08

## 2017-11-01 RX ADMIN — ENOXAPARIN SODIUM 30 MG: 100 INJECTION SUBCUTANEOUS at 20:06

## 2017-11-01 RX ADMIN — DIBASIC SODIUM PHOSPHATE, MONOBASIC POTASSIUM PHOSPHATE AND MONOBASIC SODIUM PHOSPHATE 2 TABLET: 852; 155; 130 TABLET ORAL at 09:03

## 2017-11-01 RX ADMIN — OXYCODONE HYDROCHLORIDE 10 MG: 10 TABLET ORAL at 15:08

## 2017-11-01 RX ADMIN — POLYETHYLENE GLYCOL 3350 1 PACKET: 17 POWDER, FOR SOLUTION ORAL at 09:03

## 2017-11-01 RX ADMIN — OXYCODONE HYDROCHLORIDE 10 MG: 10 TABLET ORAL at 04:57

## 2017-11-01 RX ADMIN — LEVOTHYROXINE SODIUM 75 MCG: 75 TABLET ORAL at 06:22

## 2017-11-01 RX ADMIN — MAGNESIUM HYDROXIDE 30 ML: 400 SUSPENSION ORAL at 09:02

## 2017-11-01 RX ADMIN — DOCUSATE SODIUM 100 MG: 100 CAPSULE ORAL at 20:06

## 2017-11-01 ASSESSMENT — PAIN SCALES - GENERAL
PAINLEVEL_OUTOF10: 8
PAINLEVEL_OUTOF10: 2
PAINLEVEL_OUTOF10: 8
PAINLEVEL_OUTOF10: 5
PAINLEVEL_OUTOF10: 4
PAINLEVEL_OUTOF10: 3
PAINLEVEL_OUTOF10: 7
PAINLEVEL_OUTOF10: 8
PAINLEVEL_OUTOF10: 1

## 2017-11-01 ASSESSMENT — ENCOUNTER SYMPTOMS
NAUSEA: 0
VOMITING: 0
PSYCHIATRIC NEGATIVE: 1
ROS GI COMMENTS: BM 10/31
RESPIRATORY NEGATIVE: 1
MYALGIAS: 1
NEUROLOGICAL NEGATIVE: 1
EYES NEGATIVE: 1
CONSTITUTIONAL NEGATIVE: 1
CARDIOVASCULAR NEGATIVE: 1
ABDOMINAL PAIN: 1

## 2017-11-01 ASSESSMENT — COPD QUESTIONNAIRES
COPD SCREENING SCORE: 1
HAVE YOU SMOKED AT LEAST 100 CIGARETTES IN YOUR ENTIRE LIFE: NO/DON'T KNOW
DURING THE PAST 4 WEEKS HOW MUCH DID YOU FEEL SHORT OF BREATH: NONE/LITTLE OF THE TIME
DURING THE PAST 4 WEEKS HOW MUCH DID YOU FEEL SHORT OF BREATH: NONE/LITTLE OF THE TIME
DO YOU EVER COUGH UP ANY MUCUS OR PHLEGM?: NO/ONLY WITH OCCASIONAL COLDS OR INFECTIONS

## 2017-11-01 ASSESSMENT — LIFESTYLE VARIABLES: DO YOU DRINK ALCOHOL: NO
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
## 2017-11-01 NOTE — DISCHARGE PLANNING
SW spoke with Patient Financial Assistance regarding this pt and insurance qualification. Patient does not qualify for Medicaid/ MediCal due to her undocumented status. Patient may qualify for Emergency Medicaid which will help pay for the hospital stay, but not long term placement.

## 2017-11-01 NOTE — DISCHARGE PLANNING
Patient meets criteria for SALO. Patient is pending Emergency Medi-Kin.  Advised C[facility] to send SALO referral to N SNF

## 2017-11-01 NOTE — PROGRESS NOTES
"  Trauma/Surgical Progress Note    Author: Patricia Bernabe Date & Time created: 11/1/2017   3:16 PM     Interval Events:  Inadeuqate pain control - discussed with nursing use of narcotics and frequency  Continue PT/OT  Hgb 9.3 - stable    Review of Systems   Constitutional: Negative.    HENT: Negative.    Eyes: Negative.    Respiratory: Negative.    Cardiovascular: Negative.    Gastrointestinal: Positive for abdominal pain. Negative for nausea and vomiting.        BM 10/31   Musculoskeletal: Positive for joint pain and myalgias.        Extremity pain   Skin: Negative.    Neurological: Negative.    Psychiatric/Behavioral: Negative.    All other systems reviewed and are negative.    Hemodynamics:  Blood pressure 108/61, pulse 86, temperature 36.5 °C (97.7 °F), resp. rate 16, height 1.499 m (4' 11\"), weight 76.7 kg (169 lb 1.5 oz), SpO2 97 %, not currently breastfeeding.     Respiratory:    Respiration: 16, Pulse Oximetry: 97 %     Work Of Breathing / Effort: Mild  RUL Breath Sounds: Clear, RML Breath Sounds: Clear, RLL Breath Sounds: Diminished, KEYUR Breath Sounds: Diminished, LLL Breath Sounds: Diminished  Fluids:    Intake/Output Summary (Last 24 hours) at 11/01/17 1516  Last data filed at 11/01/17 1200   Gross per 24 hour   Intake              240 ml   Output             3910 ml   Net            -3670 ml     Admit Weight: 68.1 kg (150 lb 2.1 oz)  Current      Physical Exam   Constitutional: She is oriented to person, place, and time. Vital signs are normal. She appears well-developed and well-nourished. She does not appear ill. No distress.   HENT:   Head: Atraumatic.   Eyes: Conjunctivae are normal.   Neck: Trachea normal and normal range of motion. No JVD present.   Cardiovascular: Normal rate, regular rhythm, intact distal pulses and normal pulses.    Pulmonary/Chest: Effort normal and breath sounds normal. No accessory muscle usage. No respiratory distress. She exhibits no tenderness.   Abdominal: Soft. Normal " appearance. She exhibits no distension. There is tenderness in the suprapubic area.   Genitourinary:   Genitourinary Comments: Parr to gravity   Musculoskeletal: She exhibits tenderness (Pelvis and bilateral lower extremities).   Dressing to bilateral lower extremities in place.  Hinged brace left lower extremity    Neurological: She is alert and oriented to person, place, and time. GCS eye subscore is 4. GCS verbal subscore is 5. GCS motor subscore is 6.   Skin: Skin is warm and dry.   Psychiatric: She has a normal mood and affect. Her speech is normal and behavior is normal.   Nursing note and vitals reviewed.      Medical Decision Making/Problem List:    Active Hospital Problems    Diagnosis   • Multiple fractures of pelvis (CMS-HCC) [S32.810A]     Priority: High     Bilateral superior and inferior pubic rami fractures. Displaced on the left.   There is likely fracture of at least the right sacral alar.   Possible fracture of the right posterior acetabulum.  10/26 - Percutaneous screw fixation  Weight bearing status - TTWB BLE.      Emory Bajwa MD. Orthopedic surgery     • Anemia associated with acute blood loss [D62]     Priority: Medium     10/28 -Transfuse 1 unit PRBC.   - Iron replacement per pharmacy kinetics  Transfuse 1 unit PRBC if Hb < 7.0.      • Closed nondisplaced fracture of lateral malleolus of left fibula [S82.65XA]     Priority: Medium     Minimally displaced  Small adjacent puncture wound.  10/26 - ORIF  Weight bearing status - TTWB.  Emory Bajwa MD. Orthopedic Surgery     • Type 2 diabetes mellitus (CMS-HCC) [E11.9]     Priority: Medium     Chronic condition treated with metformin 1000 mg twice a day..  10/29 - HbA1C - 6.0 (126)  10/30 - DC Lantus. Resume maintenance medication at 500 mg twice a day. Continue High ISS and serial BS..  Adequate glycemic control       • Closed fracture of right femur (CMS-HCC) [S72.91XA]     Priority: Medium      Comminuted fractures of the proximal and  distal femur, right .  10/26 - ORIF  Weight bearing status - TTWB BLE.  Emory Bajwa MD. Orthopedic surgery     • Spleen laceration [S36.039A]     Priority: Medium     Small grade 1 laceration, lateral inferior aspect of the spleen.  Trend abdominal exam and laboratory studies      • Traumatic rupture of bladder [S37.29XA]     Priority: Medium     Intra-peritoneal rupture  10/25 - Layered closure  Parr to remain in place for 10 days - 11/4 (ordered)   Cystogram prior to removal  Trino Tsang MD. Trauma surgery      • Hypothyroid [E03.9]     Priority: Low     Chronic condition treated with synthroid.  Resume maintenance medication.       • Hyperlipidemia [E78.5]     Priority: Low     Chronic condition treated with simvastatin.  Resume maintenance medication.       • No contraindication to deep vein thrombosis (DVT) prophylaxis [Z78.9]     Priority: Low     Systemic anticoagulation contraindicated secondary to elevated bleeding risk.  RAP score 10  10/29 - Truama duplex LE study negative   10/30 - Initiate pharmacological DVT prophylaxis     • HTN (hypertension) [I10]     Priority: Low     Chronic condition treated with lisinopril.  Adequate BP control off of maintenance medication.        Core Measures & Quality Metrics:  Labs reviewed, Medications reviewed and Radiology images reviewed  Parr catheter: Urologic Surgery or Other Surgery on Contiguous Structures of the Genitourinary Tract or Studies      DVT Prophylaxis: Enoxaparin (Lovenox)    Ulcer prophylaxis: Not indicated    Assessed for rehab: Patient was assess for and/or received rehabilitation services during this hospitalization    Total Score: 10  Discussed patient condition with RN, Patient and trauma surgery. Dr. Lo

## 2017-11-01 NOTE — PROGRESS NOTES
Patient alert and oriented x 3. Macedonian speaking, but can communicate in English. Brace intact to Right lower extremity. Medicated for pain to lower extremities, with positive effect. Family members were at bedside. Dressings intact to bilateral hips. Call light within reach and personal belongings in reach.

## 2017-11-01 NOTE — CARE PLAN
Problem: Skin Integrity  Goal: Risk for impaired skin integrity will decrease  Outcome: PROGRESSING AS EXPECTED  Pt will remain turning throughout day to maintain sacral integrity

## 2017-11-01 NOTE — DISCHARGE PLANNING
Medical Social Work     PILY called the ABHILASH Gomez to advise her that the pt's sister needed FMLA paperwork filled out. Patricia stated that she was already off but to contact Corinne who is the administrative assistance for the APN's at [phone] and she fills out the FMLA paperwork. PILY called Corinne and left a voicemail. PILY also left report for day PILY tomorrow, 11/2/17.     RN advised PILY that the pt and family wanted to do advances directives. PILY went to bedside and provided the advance directive paperwork and explained the process. PILY also printed the Certificate of Competency to give to the APN tomorrow, 11/2/17.     Plan: Pily to follow up with the Certificate of Competency and FMLA paperwork tomorrow, 11/2/17.
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
## 2017-11-01 NOTE — DISCHARGE PLANNING
SNF order placed for this pt.  Upon review, pt does not have insurance. SW to complete full assessment to address discharge barriers. Patient also appears to be from [facility]

## 2017-11-01 NOTE — PROGRESS NOTES
Skin assessment performed.   Skin tear noted along right lower extremity, along cast. Dry gauze placed along cast ties as not to irritate or cause more skin breakdown to RLE tear.

## 2017-11-01 NOTE — PROGRESS NOTES
"   Orthopaedic PA Progress Note    Interval changes:Trauma note read and appreciated. Cystogram Saturday. - OK to transfer to Rehab after from Ortho perspective.    ROS - Patient denies any new issues. No chest pain, dyspnea, or fever.  Pain well controlled.    Blood pressure 101/59, pulse 92, temperature 36.3 °C (97.3 °F), resp. rate 18, height 1.499 m (4' 11\"), weight 76.7 kg (169 lb 1.5 oz), SpO2 95 %, not currently breastfeeding.    Patient seen and examined  No acute distress  Breathing non labored  RRR  Dressings on RLE changed. Blisters gone, mild serous drainage from blister sites. Incisions CDI.   Dressing LLE left in place.    Surgical dressing is clean, dry, and intact. Patient clearly fires tibialis anterior, EHL, and gastrocnemius/soleus. Sensation is intact to light touch throughout superficial peroneal, deep peroneal, tibial, saphenous, and sural nerve distributions. Strong and palpable 2+ dorsalis pedis and posterior tibial pulses with capillary refill less than 2 seconds. No lower leg tenderness or discomfort.        Recent Labs      10/29/17   1707  10/30/17   0539  10/31/17   0510   WBC  5.7  5.8  6.0   RBC  2.77*  2.75*  2.96*   HEMOGLOBIN  8.2*  8.3*  8.6*   HEMATOCRIT  24.8*  25.0*  26.9*   MCV  89.5  90.9  90.9   MCH  29.6  30.2  29.1   MCHC  33.1*  33.2*  32.0*   RDW  49.5  49.5  47.8   PLATELETCT  168  180  229   MPV  9.1  9.2  9.0       Active Hospital Problems    Diagnosis   • Multiple fractures of pelvis (CMS-[facility]) [S32.810A]     Priority: High     Bilateral superior and inferior pubic rami fractures. Displaced on the left.   There is likely fracture of at least the right sacral alar.   Possible fracture of the right posterior acetabulum.  10/26 - Percutaneous screw fixation  Weight bearing status - TTWB BLE.     Emory Bajwa MD. Orthopedic surgery     • Anemia associated with acute blood loss [D62]     Priority: Medium     10/28 -Transfuse 1 unit PRBC.   - Iron replacement per pharmacy " kinetics  Transfuse 1 unit PRBC if Hb < 7.0.      • Closed nondisplaced fracture of lateral malleolus of left fibula [S82.65XA]     Priority: Medium     Minimally displaced  Small adjacent puncture wound.  10/26 - ORIF  Weight bearing status - TTWB.  Emory Bajwa MD. Orthopedic Surgery     • Type 2 diabetes mellitus (CMS-[facility]) [E11.9]     Priority: Medium     Chronic condition treated with metformin 1000 mg twice a day..  10/29 - HbA1C - 6.0 (126)  10/30 DC Lantus. Resume maintenance medication at 500 mg twice a day. Continue High ISS and serial BS..  Adequate glycemic control      • Closed fracture of right femur (CMS-[facility]) [S72.91XA]     Priority: Medium      Comminuted fractures of the proximal and distal femur, right .  10/26 - ORIF  Weight bearing status - TTWB BLE.  Emory Bajwa MD. Orthopedic surgery     • Spleen laceration [S36.039A]     Priority: Medium     Small grade 1 laceration, lateral inferior aspect of the spleen.  Trend abdominal exam and laboratory studies     • Traumatic rupture of bladder [S37.29XA]     Priority: Medium     Intra-peritoneal rupture  10/25 - Layered closure  Parr to remain in place for 10 days - 11/4 (ordered)   Cystogram prior to removal  Trino Tsang MD. Trauma surgery     • Hypothyroid [E03.9]     Priority: Low     Chronic condition treated with synthroid.  Resume maintenance medication.       • Hyperlipidemia [E78.5]     Priority: Low     Chronic condition treated with simvastatin.  Resume maintenance medication.       • No contraindication to deep vein thrombosis (DVT) prophylaxis [Z78.9]     Priority: Low     Systemic anticoagulation contraindicated secondary to elevated bleeding risk.  RAP score 10  10/29 - Truama duplex LE study negative   10/30 - Initiate pharmacological DVT prophylaxis     • HTN (hypertension) [I10]     Priority: Low     Chronic condition treated with lisinopril.  Adequate BP control off of maintenance medication.            Assessment/Plan:  POD#4S/P  ORIF R prox femur (Hip nail), R distal femur (plate/screw fix), and L ankle.  Wt bearing status - TTWB BLE  PT/OT-initiate when ok with trauma  Wound care: Dressing change tomorrow  Drains - out  Prar-per trauma  Sutures/Staples out- 10-14 days post operatively  Antibiotics: complete  DVT Prophylaxis- TEDS/SCDs/Foot pumps.   Lovenox: 30 mg SQ q12h, Duration-until ambulatory > 150'  Future Procedures - not anticipated  Case Coordination for Discharge Planning - Disposition per trauma, will folow

## 2017-11-01 NOTE — CARE PLAN
Problem: Safety  Goal: Will remain free from falls  Outcome: PROGRESSING AS EXPECTED  Call light within reach. Patient encouraged to use call light when in need of assistance.    Problem: Pain Management  Goal: Pain level will decrease to patient's comfort goal  Outcome: PROGRESSING AS EXPECTED  Patient medicated for pain with prn pain medication.

## 2017-11-01 NOTE — CARE PLAN
Problem: Pain Management  Goal: Pain level will decrease to patient's comfort goal  Outcome: PROGRESSING SLOWER THAN EXPECTED  Pt will be able to sit at side of bed for all meals.

## 2017-11-01 NOTE — PROGRESS NOTES
0645 Received report, introduced self to pt, reviewed labs, orders, allergies, code status    0900 pt to edge of bed breakfast, tolerated movement poorly. Pleasant with staff.     1700 pt blood sugar 70, is awake, alert and oriented. Given dinner and juice, will recheck    1740 blood sugar 112, pt resting in bed after sitting up on edge of bed for dinner

## 2017-11-02 LAB
ALBUMIN SERPL BCP-MCNC: 2.9 G/DL (ref 3.2–4.9)
ALBUMIN/GLOB SERPL: 1.1 G/DL
ALP SERPL-CCNC: 63 U/L (ref 30–99)
ALT SERPL-CCNC: 58 U/L (ref 2–50)
ANION GAP SERPL CALC-SCNC: 7 MMOL/L (ref 0–11.9)
AST SERPL-CCNC: 98 U/L (ref 12–45)
BASOPHILS # BLD AUTO: 0.9 % (ref 0–1.8)
BASOPHILS # BLD: 0.06 K/UL (ref 0–0.12)
BILIRUB SERPL-MCNC: 1.8 MG/DL (ref 0.1–1.5)
BUN SERPL-MCNC: 9 MG/DL (ref 8–22)
CALCIUM SERPL-MCNC: 8.1 MG/DL (ref 8.5–10.5)
CHLORIDE SERPL-SCNC: 103 MMOL/L (ref 96–112)
CO2 SERPL-SCNC: 27 MMOL/L (ref 20–33)
CREAT SERPL-MCNC: 0.24 MG/DL (ref 0.5–1.4)
EOSINOPHIL # BLD AUTO: 0.16 K/UL (ref 0–0.51)
EOSINOPHIL NFR BLD: 2.5 % (ref 0–6.9)
ERYTHROCYTE [DISTWIDTH] IN BLOOD BY AUTOMATED COUNT: 48 FL (ref 35.9–50)
GFR SERPL CREATININE-BSD FRML MDRD: >60 ML/MIN/1.73 M 2
GLOBULIN SER CALC-MCNC: 2.7 G/DL (ref 1.9–3.5)
GLUCOSE BLD-MCNC: 104 MG/DL (ref 65–99)
GLUCOSE BLD-MCNC: 112 MG/DL (ref 65–99)
GLUCOSE BLD-MCNC: 114 MG/DL (ref 65–99)
GLUCOSE BLD-MCNC: 131 MG/DL (ref 65–99)
GLUCOSE BLD-MCNC: 132 MG/DL (ref 65–99)
GLUCOSE BLD-MCNC: 150 MG/DL (ref 65–99)
GLUCOSE BLD-MCNC: 70 MG/DL (ref 65–99)
GLUCOSE SERPL-MCNC: 139 MG/DL (ref 65–99)
HCT VFR BLD AUTO: 29.3 % (ref 37–47)
HGB BLD-MCNC: 9.4 G/DL (ref 12–16)
IMM GRANULOCYTES # BLD AUTO: 0.28 K/UL (ref 0–0.11)
IMM GRANULOCYTES NFR BLD AUTO: 4.3 % (ref 0–0.9)
LYMPHOCYTES # BLD AUTO: 1.38 K/UL (ref 1–4.8)
LYMPHOCYTES NFR BLD: 21.2 % (ref 22–41)
MAGNESIUM SERPL-MCNC: 1.8 MG/DL (ref 1.5–2.5)
MCH RBC QN AUTO: 29.2 PG (ref 27–33)
MCHC RBC AUTO-ENTMCNC: 32.1 G/DL (ref 33.6–35)
MCV RBC AUTO: 91 FL (ref 81.4–97.8)
MONOCYTES # BLD AUTO: 0.54 K/UL (ref 0–0.85)
MONOCYTES NFR BLD AUTO: 8.3 % (ref 0–13.4)
NEUTROPHILS # BLD AUTO: 4.1 K/UL (ref 2–7.15)
NEUTROPHILS NFR BLD: 62.8 % (ref 44–72)
NRBC # BLD AUTO: 0.02 K/UL
NRBC BLD AUTO-RTO: 0.3 /100 WBC
PHOSPHATE SERPL-MCNC: 3.5 MG/DL (ref 2.5–4.5)
PLATELET # BLD AUTO: 308 K/UL (ref 164–446)
PMV BLD AUTO: 9 FL (ref 9–12.9)
POTASSIUM SERPL-SCNC: 3.8 MMOL/L (ref 3.6–5.5)
PROT SERPL-MCNC: 5.6 G/DL (ref 6–8.2)
RBC # BLD AUTO: 3.22 M/UL (ref 4.2–5.4)
SODIUM SERPL-SCNC: 137 MMOL/L (ref 135–145)
WBC # BLD AUTO: 6.5 K/UL (ref 4.8–10.8)

## 2017-11-02 PROCEDURE — 85025 COMPLETE CBC W/AUTO DIFF WBC: CPT

## 2017-11-02 PROCEDURE — 3E0234Z INTRODUCTION OF SERUM, TOXOID AND VACCINE INTO MUSCLE, PERCUTANEOUS APPROACH: ICD-10-PCS | Performed by: SURGERY

## 2017-11-02 PROCEDURE — 700102 HCHG RX REV CODE 250 W/ 637 OVERRIDE(OP): Performed by: SURGERY

## 2017-11-02 PROCEDURE — 90686 IIV4 VACC NO PRSV 0.5 ML IM: CPT | Performed by: SURGERY

## 2017-11-02 PROCEDURE — 700111 HCHG RX REV CODE 636 W/ 250 OVERRIDE (IP): Performed by: SURGERY

## 2017-11-02 PROCEDURE — 770006 HCHG ROOM/CARE - MED/SURG/GYN SEMI*

## 2017-11-02 PROCEDURE — 700102 HCHG RX REV CODE 250 W/ 637 OVERRIDE(OP): Performed by: NURSE PRACTITIONER

## 2017-11-02 PROCEDURE — 80053 COMPREHEN METABOLIC PANEL: CPT

## 2017-11-02 PROCEDURE — 82962 GLUCOSE BLOOD TEST: CPT

## 2017-11-02 PROCEDURE — 90732 PPSV23 VACC 2 YRS+ SUBQ/IM: CPT | Performed by: SURGERY

## 2017-11-02 PROCEDURE — A9270 NON-COVERED ITEM OR SERVICE: HCPCS | Performed by: SURGERY

## 2017-11-02 PROCEDURE — 700112 HCHG RX REV CODE 229: Performed by: SURGERY

## 2017-11-02 PROCEDURE — A9270 NON-COVERED ITEM OR SERVICE: HCPCS | Performed by: NURSE PRACTITIONER

## 2017-11-02 PROCEDURE — 36415 COLL VENOUS BLD VENIPUNCTURE: CPT

## 2017-11-02 PROCEDURE — 90471 IMMUNIZATION ADMIN: CPT

## 2017-11-02 PROCEDURE — 84100 ASSAY OF PHOSPHORUS: CPT

## 2017-11-02 PROCEDURE — 83735 ASSAY OF MAGNESIUM: CPT

## 2017-11-02 RX ORDER — METAXALONE 800 MG/1
800 TABLET ORAL 3 TIMES DAILY
Status: DISCONTINUED | OUTPATIENT
Start: 2017-11-02 | End: 2017-11-08 | Stop reason: HOSPADM

## 2017-11-02 RX ORDER — GABAPENTIN 100 MG/1
100 CAPSULE ORAL 3 TIMES DAILY
Status: DISCONTINUED | OUTPATIENT
Start: 2017-11-02 | End: 2017-11-08 | Stop reason: HOSPADM

## 2017-11-02 RX ADMIN — LEVOTHYROXINE SODIUM 75 MCG: 75 TABLET ORAL at 05:01

## 2017-11-02 RX ADMIN — ENOXAPARIN SODIUM 30 MG: 100 INJECTION SUBCUTANEOUS at 08:32

## 2017-11-02 RX ADMIN — POLYETHYLENE GLYCOL 3350 1 PACKET: 17 POWDER, FOR SOLUTION ORAL at 21:43

## 2017-11-02 RX ADMIN — OXYCODONE HYDROCHLORIDE 10 MG: 10 TABLET ORAL at 18:21

## 2017-11-02 RX ADMIN — METAXALONE 800 MG: 800 TABLET ORAL at 16:31

## 2017-11-02 RX ADMIN — DIBASIC SODIUM PHOSPHATE, MONOBASIC POTASSIUM PHOSPHATE AND MONOBASIC SODIUM PHOSPHATE 2 TABLET: 852; 155; 130 TABLET ORAL at 08:27

## 2017-11-02 RX ADMIN — METFORMIN HYDROCHLORIDE 500 MG: 500 TABLET, EXTENDED RELEASE ORAL at 18:21

## 2017-11-02 RX ADMIN — OXYCODONE HYDROCHLORIDE 10 MG: 10 TABLET ORAL at 01:04

## 2017-11-02 RX ADMIN — DIBASIC SODIUM PHOSPHATE, MONOBASIC POTASSIUM PHOSPHATE AND MONOBASIC SODIUM PHOSPHATE 2 TABLET: 852; 155; 130 TABLET ORAL at 14:59

## 2017-11-02 RX ADMIN — OXYCODONE HYDROCHLORIDE 10 MG: 10 TABLET ORAL at 12:59

## 2017-11-02 RX ADMIN — ENOXAPARIN SODIUM 30 MG: 100 INJECTION SUBCUTANEOUS at 21:38

## 2017-11-02 RX ADMIN — METAXALONE 800 MG: 800 TABLET ORAL at 21:37

## 2017-11-02 RX ADMIN — POLYETHYLENE GLYCOL 3350 1 PACKET: 17 POWDER, FOR SOLUTION ORAL at 08:27

## 2017-11-02 RX ADMIN — OXYCODONE HYDROCHLORIDE 10 MG: 10 TABLET ORAL at 08:27

## 2017-11-02 RX ADMIN — OXYCODONE HYDROCHLORIDE 10 MG: 10 TABLET ORAL at 05:01

## 2017-11-02 RX ADMIN — GABAPENTIN 100 MG: 100 CAPSULE ORAL at 21:37

## 2017-11-02 RX ADMIN — Medication: at 14:04

## 2017-11-02 RX ADMIN — DOCUSATE SODIUM 100 MG: 100 CAPSULE ORAL at 08:27

## 2017-11-02 RX ADMIN — DOCUSATE SODIUM 100 MG: 100 CAPSULE ORAL at 21:38

## 2017-11-02 RX ADMIN — METFORMIN HYDROCHLORIDE 500 MG: 500 TABLET, EXTENDED RELEASE ORAL at 10:36

## 2017-11-02 RX ADMIN — STANDARDIZED SENNA CONCENTRATE AND DOCUSATE SODIUM 1 TABLET: 8.6; 5 TABLET, FILM COATED ORAL at 21:43

## 2017-11-02 RX ADMIN — DIBASIC SODIUM PHOSPHATE, MONOBASIC POTASSIUM PHOSPHATE AND MONOBASIC SODIUM PHOSPHATE 2 TABLET: 852; 155; 130 TABLET ORAL at 21:37

## 2017-11-02 RX ADMIN — INFLUENZA A VIRUS A/MICHIGAN/45/2015 X-275 (H1N1) ANTIGEN (FORMALDEHYDE INACTIVATED), INFLUENZA A VIRUS A/HONG KONG/4801/2014 X-263B (H3N2) ANTIGEN (FORMALDEHYDE INACTIVATED), INFLUENZA B VIRUS B/PHUKET/3073/2013 ANTIGEN (FORMALDEHYDE INACTIVATED), AND INFLUENZA B VIRUS B/BRISBANE/60/2008 ANTIGEN (FORMALDEHYDE INACTIVATED) 0.5 ML: 15; 15; 15; 15 INJECTION, SUSPENSION INTRAMUSCULAR at 16:34

## 2017-11-02 RX ADMIN — GABAPENTIN 100 MG: 100 CAPSULE ORAL at 14:59

## 2017-11-02 RX ADMIN — SIMVASTATIN 20 MG: 10 TABLET, FILM COATED ORAL at 21:43

## 2017-11-02 RX ADMIN — OXYCODONE HYDROCHLORIDE 10 MG: 10 TABLET ORAL at 23:32

## 2017-11-02 RX ADMIN — PNEUMOCOCCAL VACCINE POLYVALENT 25 MCG
25; 25; 25; 25; 25; 25; 25; 25; 25; 25; 25; 25; 25; 25; 25; 25; 25; 25; 25; 25; 25; 25; 25 INJECTION, SOLUTION INTRAMUSCULAR; SUBCUTANEOUS at 16:32

## 2017-11-02 ASSESSMENT — PAIN SCALES - GENERAL
PAINLEVEL_OUTOF10: 6
PAINLEVEL_OUTOF10: 8
PAINLEVEL_OUTOF10: 9
PAINLEVEL_OUTOF10: 9
PAINLEVEL_OUTOF10: 8
PAINLEVEL_OUTOF10: 9
PAINLEVEL_OUTOF10: 7
PAINLEVEL_OUTOF10: 8
PAINLEVEL_OUTOF10: 3
PAINLEVEL_OUTOF10: 8

## 2017-11-02 ASSESSMENT — ENCOUNTER SYMPTOMS
NAUSEA: 0
FEVER: 0
SHORTNESS OF BREATH: 0
COUGH: 0
CHILLS: 0
PSYCHIATRIC NEGATIVE: 1
VOMITING: 0
NEUROLOGICAL NEGATIVE: 1
ABDOMINAL PAIN: 1
HEADACHES: 0
MYALGIAS: 1

## 2017-11-02 NOTE — CARE PLAN
Problem: Urinary Elimination:  Goal: Ability to reestablish a normal urinary elimination pattern will improve  Patient had mcknight to gravity flowing clear yellow urine.    Problem: Safety  Goal: Will remain free from injury  Bed in lowest position, call light within reach, bed alarm in place. Will continue to monitor.

## 2017-11-02 NOTE — CARE PLAN
Problem: Nutritional:  Goal: Achieve adequate nutritional intake  Patient will consume 50% of meals and supplements   Outcome: PROGRESSING AS EXPECTED  Spoke with patient at bedside. Pt reported consuming at least 50% of most meals, which is also reflective of % recorded in ADLs. Stated she likes Boost Glucose, but requested only strawberry and vanilla flavors. Per patient request, will also add yogurt and jello BID. Will continue to monitor for consistent PO.

## 2017-11-02 NOTE — CARE PLAN
Problem: Safety  Goal: Will remain free from falls  Outcome: PROGRESSING AS EXPECTED  Fall risk assessed, call light within reach, bed in lowest position, treaded socks on, pt educated to call for assistance     Problem: Skin Integrity  Goal: Risk for impaired skin integrity will decrease  Outcome: PROGRESSING AS EXPECTED  Q2 turns in place, waffle overlay in use, pillows in use to float heels, silicone oxygen tubing in use

## 2017-11-02 NOTE — PROGRESS NOTES
"  Trauma/Surgical Progress Note    Author: Trang Moreno Date & Time created: 11/2/2017   12:43 PM     Interval Events:  Restless with pain  PT/Ot for disposition needs  Parr to remain till cystogram  language line used  Bowel protocol in place      Review of Systems   Constitutional: Negative for chills and fever.   HENT: Negative for hearing loss.    Respiratory: Negative for cough and shortness of breath.    Cardiovascular: Negative for chest pain and leg swelling.   Gastrointestinal: Positive for abdominal pain. Negative for nausea and vomiting.        Last bowel movement 11/1/2017   Genitourinary:        Parr in place   Musculoskeletal: Positive for joint pain and myalgias.        Extremity pain   Skin: Negative for rash.   Neurological: Negative.  Negative for headaches.   Psychiatric/Behavioral: Negative.    All other systems reviewed and are negative.    Hemodynamics:  Blood pressure 116/64, pulse 87, temperature 36.1 °C (97 °F), resp. rate 16, height 1.499 m (4' 11\"), weight 76.7 kg (169 lb 1.5 oz), SpO2 92 %, not currently breastfeeding.     Respiratory:    Respiration: 16, Pulse Oximetry: 92 %     Work Of Breathing / Effort: Mild  RUL Breath Sounds: Clear, RML Breath Sounds: Clear, RLL Breath Sounds: Diminished, KEYUR Breath Sounds: Clear, LLL Breath Sounds: Diminished  Fluids:    Intake/Output Summary (Last 24 hours) at 11/02/17 1243  Last data filed at 11/02/17 1200   Gross per 24 hour   Intake              240 ml   Output             3475 ml   Net            -3235 ml     Admit Weight: 68.1 kg (150 lb 2.1 oz)  Current      Physical Exam   Constitutional: She is oriented to person, place, and time. Vital signs are normal. She appears well-developed and well-nourished. She does not appear ill. No distress.   HENT:   Head: Atraumatic.   Eyes: Conjunctivae are normal.   Neck: Trachea normal and normal range of motion. No JVD present.   Cardiovascular: Normal rate, regular rhythm, intact distal pulses " and normal pulses.    Pulmonary/Chest: Effort normal and breath sounds normal. No accessory muscle usage. No respiratory distress. She exhibits no tenderness.   Abdominal: Soft. Normal appearance. She exhibits no distension. There is tenderness in the suprapubic area.   Genitourinary:   Genitourinary Comments: Parr to gravity   Musculoskeletal: She exhibits tenderness (Pelvis and bilateral lower extremities).   Dressing to bilateral lower extremities in place.  Hinged brace left lower extremity    Neurological: She is alert and oriented to person, place, and time. GCS eye subscore is 4. GCS verbal subscore is 5. GCS motor subscore is 6.   Skin: Skin is warm and dry.   Psychiatric: She has a normal mood and affect. Her speech is normal and behavior is normal.   Nursing note and vitals reviewed.      Medical Decision Making/Problem List:    Active Hospital Problems    Diagnosis   • Multiple fractures of pelvis (CMS-HCC) [S32.810A]     Priority: High     Bilateral superior and inferior pubic rami fractures. Displaced on the left.   There is likely fracture of at least the right sacral alar.   Possible fracture of the right posterior acetabulum.  10/26 - Percutaneous screw fixation  Weight bearing status - TTWB BLE.      Emory Bajwa MD. Orthopedic surgery     • Anemia associated with acute blood loss [D62]     Priority: Medium     10/28 -Transfuse 1 unit PRBC.   - Iron replacement per pharmacy kinetics  Transfuse 1 unit PRBC if Hb < 7.0.      • Closed nondisplaced fracture of lateral malleolus of left fibula [S82.65XA]     Priority: Medium     Minimally displaced  Small adjacent puncture wound.  10/26 - ORIF  Weight bearing status - TTWB.  Emory Bajwa MD. Orthopedic Surgery     • Type 2 diabetes mellitus (CMS-HCC) [E11.9]     Priority: Medium     Chronic condition treated with metformin 1000 mg twice a day..  10/29 - HbA1C - 6.0 (126)  10/30 - DC Lantus. Resume maintenance medication at 500 mg twice a day. Continue  High ISS and serial BS..  Adequate glycemic control       • Closed fracture of right femur (CMS-HCC) [S72.91XA]     Priority: Medium      Comminuted fractures of the proximal and distal femur, right .  10/26 - ORIF  Weight bearing status - TTWB BLE.  Emory Bajwa MD. Orthopedic surgery     • Spleen laceration [S36.039A]     Priority: Medium     Small grade 1 laceration, lateral inferior aspect of the spleen.  Trend abdominal exam and laboratory studies      • Traumatic rupture of bladder [S37.29XA]     Priority: Medium     Intra-peritoneal rupture  10/25 - Layered closure  Parr to remain in place for 10 days - 11/4 (ordered)   Cystogram prior to removal  Trino Tsang MD. Trauma surgery      • Hypothyroid [E03.9]     Priority: Low     Chronic condition treated with synthroid.  Resume maintenance medication.       • Hyperlipidemia [E78.5]     Priority: Low     Chronic condition treated with simvastatin.  Resume maintenance medication.       • No contraindication to deep vein thrombosis (DVT) prophylaxis [Z78.9]     Priority: Low     Systemic anticoagulation contraindicated secondary to elevated bleeding risk.  RAP score 10  10/29 - Truama duplex LE study negative   10/30 - Initiate pharmacological DVT prophylaxis     • HTN (hypertension) [I10]     Priority: Low     Chronic condition treated with lisinopril.  Adequate BP control off of maintenance medication.        Core Measures & Quality Metrics:  Labs reviewed, Medications reviewed and Radiology images reviewed  Parr catheter: Urologic Surgery or Other Surgery on Contiguous Structures of the Genitourinary Tract or Studies and Urinary Tract Retention or Urinary Tract Obstruction      DVT Prophylaxis: Enoxaparin (Lovenox)    Ulcer prophylaxis: Not indicated    Assessed for rehab: Patient was assess for and/or received rehabilitation services during this hospitalization    Total Score: 10     ETOH Screening     Intervention complete date: 11/2/2017  Patient  response to intervention: does not drink daily.       Discussed patient condition with RN, Patient and trauma surgery. Dr. Tsang

## 2017-11-02 NOTE — CARE PLAN
Problem: Pain Management  Goal: Pain level will decrease to patient's comfort goal  Patient given pain medication as needed for pain.

## 2017-11-02 NOTE — PROGRESS NOTES
Assumed care of pt at 0700. Pt sitting in bed talking on the phone, oriented x4, Pt rates pain 6 out of 10 generalized, requests rest. Pt - N/V. + BS, + flatus, - BM. Midline abdominal incision with island dressing, old drainage, intact; R hip to knee dressing CDI, immobilizer in place; L hip dressing CDI; L ankle with ace wrap, CDI. Pt TTWB BLE, Q2 turns in place, waffle overlay in use. Labs noted, assessment complete. Pt updated on POC. Pt educated to use call light when in need of assisstance. Bed locked and in lowest position. All needs met at this time, call light within reach, will continue to monitor.

## 2017-11-02 NOTE — PROGRESS NOTES
"   Orthopaedic Progress Note    Interval changes:  Patient doing well  Cleared for DC by ortho for DC to rehab or SNF  Will need DVT prophylaxis at DC   NWB BLE x10 weeks    ROS - Patient denies any new issues.  Pain well controlled.    Blood pressure 108/69, pulse 98, temperature 36.3 °C (97.4 °F), resp. rate 15, height 1.499 m (4' 11\"), weight 76.7 kg (169 lb 1.5 oz), SpO2 96 %, not currently breastfeeding.      Patient seen and examined  No acute distress  Breathing non labored  RRR  BLE dressings are clean, dry, and intact. Patient clearly fires tibialis anterior, EHL, and gastrocnemius/soleus. Sensation is intact to light touch throughout superficial peroneal, deep peroneal, tibial, saphenous, and sural nerve distributions. Strong and palpable 2+ dorsalis pedis and posterior tibial pulses with capillary refill less than 2 seconds. No lower leg tenderness or discomfort.       Recent Labs      10/30/17   0539  10/31/17   0510  11/01/17   0506   WBC  5.8  6.0  5.8   RBC  2.75*  2.96*  3.18*   HEMOGLOBIN  8.3*  8.6*  9.3*   HEMATOCRIT  25.0*  26.9*  29.1*   MCV  90.9  90.9  91.5   MCH  30.2  29.1  29.2   MCHC  33.2*  32.0*  32.0*   RDW  49.5  47.8  48.2   PLATELETCT  180  229  267   MPV  9.2  9.0  8.8*       Active Hospital Problems    Diagnosis   • Multiple fractures of pelvis (CMS-[facility]) [S32.810A]     Priority: High     Bilateral superior and inferior pubic rami fractures. Displaced on the left.   There is likely fracture of at least the right sacral alar.   Possible fracture of the right posterior acetabulum.  10/26 - Percutaneous screw fixation  Weight bearing status - TTWB BLE.      Emory Bajwa MD. Orthopedic surgery     • Anemia associated with acute blood loss [D62]     Priority: Medium     10/28 -Transfuse 1 unit PRBC.   - Iron replacement per pharmacy kinetics  Transfuse 1 unit PRBC if Hb < 7.0.      • Closed nondisplaced fracture of lateral malleolus of left fibula [S82.65XA]     Priority: Medium     " Minimally displaced  Small adjacent puncture wound.  10/26 - ORIF  Weight bearing status - TTWB.  Emory Bajwa MD. Orthopedic Surgery     • Type 2 diabetes mellitus (CMS-HCC) [E11.9]     Priority: Medium     Chronic condition treated with metformin 1000 mg twice a day..  10/29 - HbA1C - 6.0 (126)  10/30 - DC Lantus. Resume maintenance medication at 500 mg twice a day. Continue High ISS and serial BS..  Adequate glycemic control       • Closed fracture of right femur (CMS-HCC) [S72.91XA]     Priority: Medium      Comminuted fractures of the proximal and distal femur, right .  10/26 - ORIF  Weight bearing status - TTWB BLE.  Emory Bajwa MD. Orthopedic surgery     • Spleen laceration [S36.039A]     Priority: Medium     Small grade 1 laceration, lateral inferior aspect of the spleen.  Trend abdominal exam and laboratory studies      • Traumatic rupture of bladder [S37.29XA]     Priority: Medium     Intra-peritoneal rupture  10/25 - Layered closure  Parr to remain in place for 10 days - 11/4 (ordered)   Cystogram prior to removal  Trino Tsang MD. Trauma surgery      • Hypothyroid [E03.9]     Priority: Low     Chronic condition treated with synthroid.  Resume maintenance medication.       • Hyperlipidemia [E78.5]     Priority: Low     Chronic condition treated with simvastatin.  Resume maintenance medication.       • No contraindication to deep vein thrombosis (DVT) prophylaxis [Z78.9]     Priority: Low     Systemic anticoagulation contraindicated secondary to elevated bleeding risk.  RAP score 10  10/29 - Truama duplex LE study negative   10/30 - Initiate pharmacological DVT prophylaxis     • HTN (hypertension) [I10]     Priority: Low     Chronic condition treated with lisinopril.  Adequate BP control off of maintenance medication.          Assessment/Plan:  Patient doing well post op  Cleared by ortho for DC to SNF pending medicine clearance  POD#6 S/P:  1.  Percutaneous screw fixation of bilateral posterior  pelvic ring injury.  2.  Open reduction and internal fixation of right distal femur supracondylar   femur fracture.  3.  Surgical treatment of right subtrochanteric femur fracture with   intramedullary device.  4.  Irrigation and debridement of site of open fracture including soft tissue,   muscle, and bone.  5.  Open reduction and internal fixation of left medial malleolus fracture.  Wt bearing status - NWB BLE x10 weeks  Wound care/Drains - dressings changed every other week by nursing  Future Procedures - none planned  Lovenox: Start 10/30, Duration-until ambulatory > 150'  Sutures/Staples out- 10-14 days post operatively  PT/OT-initiated  Antibiotics: completed  DVT Prophylaxis- TEDS/SCDs/Foot pumps  Parr-none  Case Coordination for Discharge Planning - Disposition SNF vs rehab

## 2017-11-03 LAB
GLUCOSE BLD-MCNC: 123 MG/DL (ref 65–99)
GLUCOSE BLD-MCNC: 130 MG/DL (ref 65–99)
GLUCOSE BLD-MCNC: 155 MG/DL (ref 65–99)
GLUCOSE BLD-MCNC: 156 MG/DL (ref 65–99)
GLUCOSE BLD-MCNC: 181 MG/DL (ref 65–99)
MAGNESIUM SERPL-MCNC: 1.9 MG/DL (ref 1.5–2.5)
PHOSPHATE SERPL-MCNC: 4.1 MG/DL (ref 2.5–4.5)

## 2017-11-03 PROCEDURE — A9270 NON-COVERED ITEM OR SERVICE: HCPCS | Performed by: SURGERY

## 2017-11-03 PROCEDURE — 700102 HCHG RX REV CODE 250 W/ 637 OVERRIDE(OP): Performed by: SURGERY

## 2017-11-03 PROCEDURE — 700102 HCHG RX REV CODE 250 W/ 637 OVERRIDE(OP): Performed by: NURSE PRACTITIONER

## 2017-11-03 PROCEDURE — 82962 GLUCOSE BLOOD TEST: CPT

## 2017-11-03 PROCEDURE — 36415 COLL VENOUS BLD VENIPUNCTURE: CPT

## 2017-11-03 PROCEDURE — 84100 ASSAY OF PHOSPHORUS: CPT

## 2017-11-03 PROCEDURE — 770001 HCHG ROOM/CARE - MED/SURG/GYN PRIV*

## 2017-11-03 PROCEDURE — 700112 HCHG RX REV CODE 229: Performed by: SURGERY

## 2017-11-03 PROCEDURE — A9270 NON-COVERED ITEM OR SERVICE: HCPCS | Performed by: NURSE PRACTITIONER

## 2017-11-03 PROCEDURE — 700111 HCHG RX REV CODE 636 W/ 250 OVERRIDE (IP): Performed by: SURGERY

## 2017-11-03 PROCEDURE — 97535 SELF CARE MNGMENT TRAINING: CPT

## 2017-11-03 PROCEDURE — 83735 ASSAY OF MAGNESIUM: CPT

## 2017-11-03 RX ADMIN — DOCUSATE SODIUM 100 MG: 100 CAPSULE ORAL at 08:35

## 2017-11-03 RX ADMIN — LEVOTHYROXINE SODIUM 75 MCG: 75 TABLET ORAL at 06:07

## 2017-11-03 RX ADMIN — MAGNESIUM HYDROXIDE 30 ML: 400 SUSPENSION ORAL at 08:35

## 2017-11-03 RX ADMIN — GABAPENTIN 100 MG: 100 CAPSULE ORAL at 08:35

## 2017-11-03 RX ADMIN — OXYCODONE HYDROCHLORIDE 10 MG: 10 TABLET ORAL at 20:49

## 2017-11-03 RX ADMIN — DIBASIC SODIUM PHOSPHATE, MONOBASIC POTASSIUM PHOSPHATE AND MONOBASIC SODIUM PHOSPHATE 2 TABLET: 852; 155; 130 TABLET ORAL at 20:53

## 2017-11-03 RX ADMIN — OXYCODONE HYDROCHLORIDE 10 MG: 10 TABLET ORAL at 15:50

## 2017-11-03 RX ADMIN — METAXALONE 800 MG: 800 TABLET ORAL at 15:50

## 2017-11-03 RX ADMIN — SIMVASTATIN 20 MG: 10 TABLET, FILM COATED ORAL at 20:54

## 2017-11-03 RX ADMIN — METFORMIN HYDROCHLORIDE 500 MG: 500 TABLET, EXTENDED RELEASE ORAL at 08:35

## 2017-11-03 RX ADMIN — METFORMIN HYDROCHLORIDE 500 MG: 500 TABLET, EXTENDED RELEASE ORAL at 18:58

## 2017-11-03 RX ADMIN — OXYCODONE HYDROCHLORIDE 10 MG: 10 TABLET ORAL at 08:48

## 2017-11-03 RX ADMIN — METAXALONE 800 MG: 800 TABLET ORAL at 20:49

## 2017-11-03 RX ADMIN — METAXALONE 800 MG: 800 TABLET ORAL at 08:34

## 2017-11-03 RX ADMIN — OXYCODONE HYDROCHLORIDE 10 MG: 10 TABLET ORAL at 12:39

## 2017-11-03 RX ADMIN — DOCUSATE SODIUM 100 MG: 100 CAPSULE ORAL at 20:53

## 2017-11-03 RX ADMIN — POLYETHYLENE GLYCOL 3350 1 PACKET: 17 POWDER, FOR SOLUTION ORAL at 08:36

## 2017-11-03 RX ADMIN — INSULIN LISPRO 3 UNITS: 100 INJECTION, SOLUTION INTRAVENOUS; SUBCUTANEOUS at 23:05

## 2017-11-03 RX ADMIN — GABAPENTIN 100 MG: 100 CAPSULE ORAL at 20:53

## 2017-11-03 RX ADMIN — ENOXAPARIN SODIUM 30 MG: 100 INJECTION SUBCUTANEOUS at 08:35

## 2017-11-03 RX ADMIN — POLYETHYLENE GLYCOL 3350 1 PACKET: 17 POWDER, FOR SOLUTION ORAL at 20:53

## 2017-11-03 RX ADMIN — ENOXAPARIN SODIUM 30 MG: 100 INJECTION SUBCUTANEOUS at 20:53

## 2017-11-03 RX ADMIN — DIBASIC SODIUM PHOSPHATE, MONOBASIC POTASSIUM PHOSPHATE AND MONOBASIC SODIUM PHOSPHATE 2 TABLET: 852; 155; 130 TABLET ORAL at 08:34

## 2017-11-03 RX ADMIN — GABAPENTIN 100 MG: 100 CAPSULE ORAL at 15:50

## 2017-11-03 RX ADMIN — STANDARDIZED SENNA CONCENTRATE AND DOCUSATE SODIUM 1 TABLET: 8.6; 5 TABLET, FILM COATED ORAL at 20:53

## 2017-11-03 RX ADMIN — DIBASIC SODIUM PHOSPHATE, MONOBASIC POTASSIUM PHOSPHATE AND MONOBASIC SODIUM PHOSPHATE 2 TABLET: 852; 155; 130 TABLET ORAL at 15:50

## 2017-11-03 ASSESSMENT — ENCOUNTER SYMPTOMS
NEUROLOGICAL NEGATIVE: 1
PSYCHIATRIC NEGATIVE: 1
COUGH: 0
SHORTNESS OF BREATH: 0
MYALGIAS: 1
FEVER: 0
CHILLS: 0
ABDOMINAL PAIN: 1
HEADACHES: 0
NAUSEA: 0
VOMITING: 0

## 2017-11-03 ASSESSMENT — COGNITIVE AND FUNCTIONAL STATUS - GENERAL
DRESSING REGULAR UPPER BODY CLOTHING: A LITTLE
PERSONAL GROOMING: A LITTLE
DAILY ACTIVITIY SCORE: 16
TOILETING: A LOT
HELP NEEDED FOR BATHING: A LOT
SUGGESTED CMS G CODE MODIFIER DAILY ACTIVITY: CK
DRESSING REGULAR LOWER BODY CLOTHING: A LOT

## 2017-11-03 ASSESSMENT — PAIN SCALES - GENERAL
PAINLEVEL_OUTOF10: 8
PAINLEVEL_OUTOF10: 8

## 2017-11-03 ASSESSMENT — GAIT ASSESSMENTS: GAIT LEVEL OF ASSIST: UNABLE TO PARTICIPATE

## 2017-11-03 NOTE — PROGRESS NOTES
"  Trauma/Surgical Progress Note    Author: Trang Moreno Date & Time created: 11/3/2017   9:37 AM     Interval Events:  Cystogram tomorrow  Cleared for skilled vs rehab by ortho  Parr removal dependent on cystogram  PT/OT for disposition recommendation       Review of Systems   Constitutional: Negative for chills and fever.   HENT: Negative for hearing loss.    Respiratory: Negative for cough and shortness of breath.    Cardiovascular: Negative for chest pain and leg swelling.   Gastrointestinal: Positive for abdominal pain. Negative for nausea and vomiting.        Last bowel movement 11/3/2017  Decreased appetite    Genitourinary:        Parr in place   Musculoskeletal: Positive for joint pain and myalgias.        Extremity pain, right leg   Skin: Negative for rash.   Neurological: Negative.  Negative for headaches.   Psychiatric/Behavioral: Negative.    All other systems reviewed and are negative.    Hemodynamics:  Blood pressure 102/61, pulse 91, temperature 36.8 °C (98.3 °F), resp. rate 16, height 1.499 m (4' 11\"), weight 76.7 kg (169 lb 1.5 oz), SpO2 93 %, not currently breastfeeding.     Respiratory:    Respiration: 16, Pulse Oximetry: 93 %     Work Of Breathing / Effort: Mild  RUL Breath Sounds: Clear, RML Breath Sounds: Clear, RLL Breath Sounds: Diminished, KEYUR Breath Sounds: Clear, LLL Breath Sounds: Diminished  Fluids:    Intake/Output Summary (Last 24 hours) at 11/03/17 0937  Last data filed at 11/03/17 0400   Gross per 24 hour   Intake              720 ml   Output             2490 ml   Net            -1770 ml     Admit Weight: 68.1 kg (150 lb 2.1 oz)  Current      Physical Exam   Constitutional: She is oriented to person, place, and time. Vital signs are normal. She appears well-developed and well-nourished. She does not appear ill. No distress.   HENT:   Head: Atraumatic.   Eyes: Conjunctivae are normal.   Neck: Trachea normal and normal range of motion. No JVD present.   Cardiovascular: Normal " rate, regular rhythm, intact distal pulses and normal pulses.    Pulmonary/Chest: Effort normal and breath sounds normal. No accessory muscle usage. No respiratory distress. She exhibits no tenderness.   Abdominal: Soft. Normal appearance. She exhibits no distension. There is tenderness in the suprapubic area.   Surgical incision with serous drainage  Staples well approximated   Genitourinary:   Genitourinary Comments: Parr to gravity   Musculoskeletal: She exhibits tenderness (Pelvis and bilateral lower extremities).   Dressing to bilateral lower extremities in place.  Hinged brace left lower extremity    Neurological: She is alert and oriented to person, place, and time. GCS eye subscore is 4. GCS verbal subscore is 5. GCS motor subscore is 6.   Skin: Skin is warm and dry.   Psychiatric: She has a normal mood and affect. Her speech is normal and behavior is normal.   Nursing note and vitals reviewed.      Medical Decision Making/Problem List:    Active Hospital Problems    Diagnosis   • Multiple fractures of pelvis (CMS-HCC) [S32.810A]     Priority: High     Bilateral superior and inferior pubic rami fractures. Displaced on the left.   There is likely fracture of at least the right sacral alar.   Possible fracture of the right posterior acetabulum.  10/26 - Percutaneous screw fixation  Weight bearing status - TTWB BLE.      Emory Bajwa MD. Orthopedic surgery     • Anemia associated with acute blood loss [D62]     Priority: Medium     10/28 -Transfuse 1 unit PRBC.   - Iron replacement per pharmacy kinetics  Transfuse 1 unit PRBC if Hb < 7.0.   11/3 stablized     • Closed nondisplaced fracture of lateral malleolus of left fibula [S82.65XA]     Priority: Medium     Minimally displaced  Small adjacent puncture wound.  10/26 - ORIF  Weight bearing status - TTWB.  Emory Bajwa MD. Orthopedic Surgery     • Type 2 diabetes mellitus (CMS-HCC) [E11.9]     Priority: Medium     Chronic condition treated with metformin  1000 mg twice a day..  10/29 - HbA1C - 6.0 (126)  10/30 - DC Lantus. Resume maintenance medication at 500 mg twice a day. Continue High ISS and serial BS..  Adequate glycemic control       • Closed fracture of right femur (CMS-HCC) [S72.91XA]     Priority: Medium      Comminuted fractures of the proximal and distal femur, right .  10/26 - ORIF  Weight bearing status - TTWB BLE.  Emory Bajwa MD. Orthopedic surgery     • Spleen laceration [S36.039A]     Priority: Medium     Small grade 1 laceration, lateral inferior aspect of the spleen.  Trend abdominal exam and laboratory studies   11/5 staple removal from abdominal wound      • Traumatic rupture of bladder [S37.29XA]     Priority: Medium     Intra-peritoneal rupture  10/25 - Layered closure  Parr to remain in place for 10 days  11/4 cystogram  Trino Tsang MD. Trauma surgery      • Hypothyroid [E03.9]     Priority: Low     Chronic condition treated with synthroid.  Resume maintenance medication.       • Hyperlipidemia [E78.5]     Priority: Low     Chronic condition treated with simvastatin.  Resume maintenance medication.       • No contraindication to deep vein thrombosis (DVT) prophylaxis [Z78.9]     Priority: Low     Systemic anticoagulation contraindicated secondary to elevated bleeding risk.  RAP score 10  10/29 - Truama duplex LE study negative   10/30 - Initiate pharmacological DVT prophylaxis     • HTN (hypertension) [I10]     Priority: Low     Chronic condition treated with lisinopril.  Adequate BP control off of maintenance medication.        Core Measures & Quality Metrics:  Labs reviewed, Medications reviewed and Radiology images reviewed  Parr catheter: Urologic Surgery or Other Surgery on Contiguous Structures of the Genitourinary Tract or Studies and Urinary Tract Retention or Urinary Tract Obstruction      DVT Prophylaxis: Enoxaparin (Lovenox)    Ulcer prophylaxis: Not indicated    Assessed for rehab: Patient was assess for and/or received  rehabilitation services during this hospitalization    Total Score: 10     ETOH Screening     Intervention complete date: 11/2/2017  Patient response to intervention: does not drink daily.       Discussed patient condition with RN, Patient and trauma surgery.

## 2017-11-03 NOTE — THERAPY
"Occupational Therapy Treatment completed with focus on ADLs, ADL transfers and patient education.  Functional Status:  Pt seen for OT tx today.  Pt was pleasant and cooperative throughout the session.  Continues to be limited by pain, weakness, endurance, and self care.  Pt completed supine to sit and sit to stand with min A.  Pt completed LB dressing with max A and seated gr/hy with CGA EOB.  Needing occasional cues to initiate due to pain.  Sister present to translate into [place].  Pt and sister were educated on benefits of rehab.  Pt would benefit from continued inpt OT to increase independence with functional transfers, functional endurance, and ADLs.  Plan of Care: Will benefit from Occupational Therapy 3 times per week  Discharge Recommendations:  Equipment Will Continue to Assess for Equipment Needs. Post-acute therapy Discharge to a transitional care facility for continued skilled therapy services.    See \"Rehab Therapy-Acute\" Patient Summary Report for complete documentation.   "

## 2017-11-03 NOTE — DISCHARGE PLANNING
This SW called [facility] at [phone] and left  for Shelli that does [facility] paperwork. SW to f/u with office about receiving paperwork.

## 2017-11-03 NOTE — PROGRESS NOTES
Patient alert and oriented x 3. Primary language is [place] but is able to communicate in English. Parr patent draining to gravity. Immobilizer in place to right lower extremity. Ace wrap to left lower extremity. Patient complained of pain. Medicated for pain as ordered with positive effect.

## 2017-11-03 NOTE — THERAPY
"Physical Therapy Treatment completed.   Bed Mobility:  Supine to Sit: Maximal Assist  Transfers: Seated SB transfers EOB->w/c: Mod assist      Plan of Care: Will benefit from Physical Therapy 3 times per week  Discharge Recommendations: Equipment: Will Continue to Assess for Equipment Needs.      See \"Rehab Therapy-Acute\" Patient Summary Report for complete documentation.       "

## 2017-11-03 NOTE — PROGRESS NOTES
"   Orthopaedic Progress Note    Interval changes:  Pain issues - Neurontin and flexeril added by trauma team  Patient doing well  Cleared for DC by ortho for DC to rehab or SNF    ROS - Patient denies any new issues.  Pain well controlled.    Blood pressure 116/64, pulse 87, temperature 36.1 °C (97 °F), resp. rate 16, height 1.499 m (4' 11\"), weight 76.7 kg (169 lb 1.5 oz), SpO2 92 %, not currently breastfeeding.      Patient seen and examined  No acute distress  Breathing non labored  RRR  BLE dressings are clean, dry, and intact. Patient clearly fires tibialis anterior, EHL, and gastrocnemius/soleus. Sensation is intact to light touch throughout superficial peroneal, deep peroneal, tibial, saphenous, and sural nerve distributions. Strong and palpable 2+ dorsalis pedis and posterior tibial pulses with capillary refill less than 2 seconds. No lower leg tenderness or discomfort.       Recent Labs      10/31/17   0510  11/01/17   0506  11/02/17   0539   WBC  6.0  5.8  6.5   RBC  2.96*  3.18*  3.22*   HEMOGLOBIN  8.6*  9.3*  9.4*   HEMATOCRIT  26.9*  29.1*  29.3*   MCV  90.9  91.5  91.0   MCH  29.1  29.2  29.2   MCHC  32.0*  32.0*  32.1*   RDW  47.8  48.2  48.0   PLATELETCT  229  267  308   MPV  9.0  8.8*  9.0       Active Hospital Problems    Diagnosis   • Multiple fractures of pelvis (CMS-[facility]) [S32.810A]     Priority: High     Bilateral superior and inferior pubic rami fractures. Displaced on the left.   There is likely fracture of at least the right sacral alar.   Possible fracture of the right posterior acetabulum.  10/26 - Percutaneous screw fixation  Weight bearing status - TTWB BLE.      Emory Bajwa MD. Orthopedic surgery     • Anemia associated with acute blood loss [D62]     Priority: Medium     10/28 -Transfuse 1 unit PRBC.   - Iron replacement per pharmacy kinetics  Transfuse 1 unit PRBC if Hb < 7.0.      • Closed nondisplaced fracture of lateral malleolus of left fibula [S82.65XA]     Priority: Medium     " Minimally displaced  Small adjacent puncture wound.  10/26 - ORIF  Weight bearing status - TTWB.  Emory Bajwa MD. Orthopedic Surgery     • Type 2 diabetes mellitus (CMS-HCC) [E11.9]     Priority: Medium     Chronic condition treated with metformin 1000 mg twice a day..  10/29 - HbA1C - 6.0 (126)  10/30 - DC Lantus. Resume maintenance medication at 500 mg twice a day. Continue High ISS and serial BS..  Adequate glycemic control       • Closed fracture of right femur (CMS-HCC) [S72.91XA]     Priority: Medium      Comminuted fractures of the proximal and distal femur, right .  10/26 - ORIF  Weight bearing status - TTWB BLE.  Emory Bajwa MD. Orthopedic surgery     • Spleen laceration [S36.039A]     Priority: Medium     Small grade 1 laceration, lateral inferior aspect of the spleen.  Trend abdominal exam and laboratory studies      • Traumatic rupture of bladder [S37.29XA]     Priority: Medium     Intra-peritoneal rupture  10/25 - Layered closure  Parr to remain in place for 10 days - 11/4 (ordered)   Cystogram prior to removal  Trino Tsang MD. Trauma surgery      • Hypothyroid [E03.9]     Priority: Low     Chronic condition treated with synthroid.  Resume maintenance medication.       • Hyperlipidemia [E78.5]     Priority: Low     Chronic condition treated with simvastatin.  Resume maintenance medication.       • No contraindication to deep vein thrombosis (DVT) prophylaxis [Z78.9]     Priority: Low     Systemic anticoagulation contraindicated secondary to elevated bleeding risk.  RAP score 10  10/29 - Truama duplex LE study negative   10/30 - Initiate pharmacological DVT prophylaxis     • HTN (hypertension) [I10]     Priority: Low     Chronic condition treated with lisinopril.  Adequate BP control off of maintenance medication.          Assessment/Plan:  Patient doing well post op  Cleared by ortho for DC to SNF pending medicine clearance  POD#7 S/P:  1.  Percutaneous screw fixation of bilateral posterior  pelvic ring injury.  2.  Open reduction and internal fixation of right distal femur supracondylar   femur fracture.  3.  Surgical treatment of right subtrochanteric femur fracture with   intramedullary device.  4.  Irrigation and debridement of site of open fracture including soft tissue,   muscle, and bone.  5.  Open reduction and internal fixation of left medial malleolus fracture.  Wt bearing status - NWB BLE x10 weeks  Wound care/Drains - dressings changed every other week by nursing  Future Procedures - none planned  Lovenox: Start 10/30, Duration-until ambulatory > 150'  Sutures/Staples out- 10-14 days post operatively  PT/OT-initiated  Antibiotics: completed  DVT Prophylaxis- TEDS/SCDs/Foot pumps  Parr-none  Case Coordination for Discharge Planning - Disposition SNF vs rehab

## 2017-11-03 NOTE — DISCHARGE PLANNING
Referral: Follow Up    Intervention: SW reviewed prior documentation which indicates, an SALO is been consider as pt is pending Emergency Medi-Kin.  Per documentation, ON 11/01/17, Carlie Campos requested referral is sent to [facility].      This SW spoke with EDUAR Rodriguez and requested referral is sent to [facility].  SW will e-mail Carlie for further instructions.    Plan: As Above.

## 2017-11-04 ENCOUNTER — APPOINTMENT (OUTPATIENT)
Dept: RADIOLOGY | Facility: MEDICAL CENTER | Age: 52
DRG: 956 | End: 2017-11-04
Attending: NURSE PRACTITIONER
Payer: COMMERCIAL

## 2017-11-04 LAB
GLUCOSE BLD-MCNC: 123 MG/DL (ref 65–99)
GLUCOSE BLD-MCNC: 126 MG/DL (ref 65–99)
GLUCOSE BLD-MCNC: 133 MG/DL (ref 65–99)
MAGNESIUM SERPL-MCNC: 1.9 MG/DL (ref 1.5–2.5)
PHOSPHATE SERPL-MCNC: 3.9 MG/DL (ref 2.5–4.5)

## 2017-11-04 PROCEDURE — A9270 NON-COVERED ITEM OR SERVICE: HCPCS | Performed by: NURSE PRACTITIONER

## 2017-11-04 PROCEDURE — 700102 HCHG RX REV CODE 250 W/ 637 OVERRIDE(OP): Performed by: SURGERY

## 2017-11-04 PROCEDURE — 84100 ASSAY OF PHOSPHORUS: CPT

## 2017-11-04 PROCEDURE — 700112 HCHG RX REV CODE 229: Performed by: SURGERY

## 2017-11-04 PROCEDURE — 74430 CONTRAST X-RAY BLADDER: CPT

## 2017-11-04 PROCEDURE — 770001 HCHG ROOM/CARE - MED/SURG/GYN PRIV*

## 2017-11-04 PROCEDURE — A9270 NON-COVERED ITEM OR SERVICE: HCPCS | Performed by: SURGERY

## 2017-11-04 PROCEDURE — 36415 COLL VENOUS BLD VENIPUNCTURE: CPT

## 2017-11-04 PROCEDURE — 700102 HCHG RX REV CODE 250 W/ 637 OVERRIDE(OP): Performed by: NURSE PRACTITIONER

## 2017-11-04 PROCEDURE — 83735 ASSAY OF MAGNESIUM: CPT

## 2017-11-04 PROCEDURE — 700117 HCHG RX CONTRAST REV CODE 255: Performed by: NURSE PRACTITIONER

## 2017-11-04 PROCEDURE — 82962 GLUCOSE BLOOD TEST: CPT

## 2017-11-04 PROCEDURE — 700111 HCHG RX REV CODE 636 W/ 250 OVERRIDE (IP): Performed by: SURGERY

## 2017-11-04 PROCEDURE — BT101ZZ FLUOROSCOPY OF BLADDER USING LOW OSMOLAR CONTRAST: ICD-10-PCS | Performed by: RADIOLOGY

## 2017-11-04 RX ADMIN — LEVOTHYROXINE SODIUM 75 MCG: 75 TABLET ORAL at 05:24

## 2017-11-04 RX ADMIN — OXYCODONE HYDROCHLORIDE 10 MG: 10 TABLET ORAL at 05:24

## 2017-11-04 RX ADMIN — ENOXAPARIN SODIUM 30 MG: 100 INJECTION SUBCUTANEOUS at 20:45

## 2017-11-04 RX ADMIN — GABAPENTIN 100 MG: 100 CAPSULE ORAL at 08:57

## 2017-11-04 RX ADMIN — METAXALONE 800 MG: 800 TABLET ORAL at 08:56

## 2017-11-04 RX ADMIN — STANDARDIZED SENNA CONCENTRATE AND DOCUSATE SODIUM 1 TABLET: 8.6; 5 TABLET, FILM COATED ORAL at 20:45

## 2017-11-04 RX ADMIN — DOCUSATE SODIUM 100 MG: 100 CAPSULE ORAL at 08:56

## 2017-11-04 RX ADMIN — GABAPENTIN 100 MG: 100 CAPSULE ORAL at 14:27

## 2017-11-04 RX ADMIN — OXYCODONE HYDROCHLORIDE 10 MG: 10 TABLET ORAL at 02:19

## 2017-11-04 RX ADMIN — POLYETHYLENE GLYCOL 3350 1 PACKET: 17 POWDER, FOR SOLUTION ORAL at 08:55

## 2017-11-04 RX ADMIN — IOHEXOL 200 ML: 240 INJECTION, SOLUTION INTRATHECAL; INTRAVASCULAR; INTRAVENOUS; ORAL at 16:00

## 2017-11-04 RX ADMIN — DIBASIC SODIUM PHOSPHATE, MONOBASIC POTASSIUM PHOSPHATE AND MONOBASIC SODIUM PHOSPHATE 2 TABLET: 852; 155; 130 TABLET ORAL at 20:45

## 2017-11-04 RX ADMIN — METAXALONE 800 MG: 800 TABLET ORAL at 20:45

## 2017-11-04 RX ADMIN — ENOXAPARIN SODIUM 30 MG: 100 INJECTION SUBCUTANEOUS at 09:33

## 2017-11-04 RX ADMIN — METFORMIN HYDROCHLORIDE 500 MG: 500 TABLET, EXTENDED RELEASE ORAL at 08:57

## 2017-11-04 RX ADMIN — GABAPENTIN 100 MG: 100 CAPSULE ORAL at 20:45

## 2017-11-04 RX ADMIN — METFORMIN HYDROCHLORIDE 500 MG: 500 TABLET, EXTENDED RELEASE ORAL at 17:37

## 2017-11-04 RX ADMIN — OXYCODONE HYDROCHLORIDE 10 MG: 10 TABLET ORAL at 16:34

## 2017-11-04 RX ADMIN — METAXALONE 800 MG: 800 TABLET ORAL at 14:27

## 2017-11-04 RX ADMIN — OXYCODONE HYDROCHLORIDE 10 MG: 10 TABLET ORAL at 10:34

## 2017-11-04 RX ADMIN — OXYCODONE HYDROCHLORIDE 10 MG: 10 TABLET ORAL at 20:39

## 2017-11-04 RX ADMIN — DIBASIC SODIUM PHOSPHATE, MONOBASIC POTASSIUM PHOSPHATE AND MONOBASIC SODIUM PHOSPHATE 2 TABLET: 852; 155; 130 TABLET ORAL at 14:27

## 2017-11-04 RX ADMIN — DOCUSATE SODIUM 100 MG: 100 CAPSULE ORAL at 20:45

## 2017-11-04 RX ADMIN — SIMVASTATIN 20 MG: 10 TABLET, FILM COATED ORAL at 20:44

## 2017-11-04 RX ADMIN — DIBASIC SODIUM PHOSPHATE, MONOBASIC POTASSIUM PHOSPHATE AND MONOBASIC SODIUM PHOSPHATE 2 TABLET: 852; 155; 130 TABLET ORAL at 08:56

## 2017-11-04 RX ADMIN — POLYETHYLENE GLYCOL 3350 1 PACKET: 17 POWDER, FOR SOLUTION ORAL at 20:45

## 2017-11-04 ASSESSMENT — ENCOUNTER SYMPTOMS
ABDOMINAL PAIN: 1
VOMITING: 0
CHILLS: 0
SHORTNESS OF BREATH: 0
PSYCHIATRIC NEGATIVE: 1
COUGH: 0
HEADACHES: 0
FEVER: 0
NEUROLOGICAL NEGATIVE: 1
MYALGIAS: 1
NAUSEA: 0

## 2017-11-04 ASSESSMENT — PAIN SCALES - GENERAL
PAINLEVEL_OUTOF10: 9
PAINLEVEL_OUTOF10: 7
PAINLEVEL_OUTOF10: 5
PAINLEVEL_OUTOF10: 6
PAINLEVEL_OUTOF10: 5
PAINLEVEL_OUTOF10: 5
PAINLEVEL_OUTOF10: 8
PAINLEVEL_OUTOF10: 5

## 2017-11-04 NOTE — PROGRESS NOTES
Patient refused bed alarm.  Patient educated on fall risks and importance of using call light.  Discussed fall prevention and safety.  Patient verbalized understanding and continued to refuse bed alarm. Fall precautions in place: treaded slipper socks, bed is in low position, personal belongings, wastebasket, call light, and phone are within patient's reach.

## 2017-11-04 NOTE — CARE PLAN
Problem: Urinary Elimination:  Goal: Ability to reestablish a normal urinary elimination pattern will improve    Intervention: Evaluate need to continue indwelling urinary catheter  Returned from cystogram.  Parr with + yellow urine, hazy.        Problem: Skin Integrity  Goal: Risk for impaired skin integrity will decrease    Intervention: Assess risk factors for impaired skin integrity and/or pressure ulcers  Waffle overlay in use, sacral mepilex placed. Trapeze in use for repositioning.

## 2017-11-04 NOTE — DISCHARGE PLANNING
Received call from Andreia at [facility], they have pended referral until reviewed by Osito for acceptance of SALO.

## 2017-11-04 NOTE — PROGRESS NOTES
"   Orthopaedic Progress Note    Interval changes:  Pain issues much better today after yesterdays med adjustment  Patient doing well  Cleared for DC by ortho for DC to rehab or SNF    ROS - Patient denies any new issues.  Pain well controlled.    Blood pressure 105/58, pulse 88, temperature 36.8 °C (98.2 °F), resp. rate 16, height 1.499 m (4' 11\"), weight 76.7 kg (169 lb 1.5 oz), SpO2 95 %, not currently breastfeeding.      Patient seen and examined  No acute distress  Breathing non labored  RRR  BLE dressings are clean, dry, and intact. Patient clearly fires tibialis anterior, EHL, and gastrocnemius/soleus. Sensation is intact to light touch throughout superficial peroneal, deep peroneal, tibial, saphenous, and sural nerve distributions. Strong and palpable 2+ dorsalis pedis and posterior tibial pulses with capillary refill less than 2 seconds. No lower leg tenderness or discomfort.       Recent Labs      11/01/17   0506  11/02/17   0539   WBC  5.8  6.5   RBC  3.18*  3.22*   HEMOGLOBIN  9.3*  9.4*   HEMATOCRIT  29.1*  29.3*   MCV  91.5  91.0   MCH  29.2  29.2   MCHC  32.0*  32.1*   RDW  48.2  48.0   PLATELETCT  267  308   MPV  8.8*  9.0       Active Hospital Problems    Diagnosis   • Multiple fractures of pelvis (CMS-[facility]) [S32.810A]     Priority: High     Bilateral superior and inferior pubic rami fractures. Displaced on the left.   There is likely fracture of at least the right sacral alar.   Possible fracture of the right posterior acetabulum.  10/26 - Percutaneous screw fixation  Weight bearing status - TTWB BLE.      Emory Bajwa MD. Orthopedic surgery     • Anemia associated with acute blood loss [D62]     Priority: Medium     10/28 -Transfuse 1 unit PRBC.   - Iron replacement per pharmacy kinetics  Transfuse 1 unit PRBC if Hb < 7.0.   11/3 stablized     • Closed nondisplaced fracture of lateral malleolus of left fibula [S82.65XA]     Priority: Medium     Minimally displaced  Small adjacent puncture " wound.  10/26 - ORIF  Weight bearing status - TTWB.  Emory Bajwa MD. Orthopedic Surgery     • Type 2 diabetes mellitus (CMS-HCC) [E11.9]     Priority: Medium     Chronic condition treated with metformin 1000 mg twice a day..  10/29 - HbA1C - 6.0 (126)  10/30 - DC Lantus. Resume maintenance medication at 500 mg twice a day. Continue High ISS and serial BS..  Adequate glycemic control       • Closed fracture of right femur (CMS-HCC) [S72.91XA]     Priority: Medium      Comminuted fractures of the proximal and distal femur, right .  10/26 - ORIF  Weight bearing status - TTWB BLE.  Emory Bajwa MD. Orthopedic surgery     • Spleen laceration [S36.039A]     Priority: Medium     Small grade 1 laceration, lateral inferior aspect of the spleen.  Trend abdominal exam and laboratory studies   11/5 staple removal from abdominal wound      • Traumatic rupture of bladder [S37.29XA]     Priority: Medium     Intra-peritoneal rupture  10/25 - Layered closure  Parr to remain in place for 10 days  11/4 cystogram  Trino Tsang MD. Trauma surgery      • Hypothyroid [E03.9]     Priority: Low     Chronic condition treated with synthroid.  Resume maintenance medication.       • Hyperlipidemia [E78.5]     Priority: Low     Chronic condition treated with simvastatin.  Resume maintenance medication.       • No contraindication to deep vein thrombosis (DVT) prophylaxis [Z78.9]     Priority: Low     Systemic anticoagulation contraindicated secondary to elevated bleeding risk.  RAP score 10  10/29 - Truama duplex LE study negative   10/30 - Initiate pharmacological DVT prophylaxis     • HTN (hypertension) [I10]     Priority: Low     Chronic condition treated with lisinopril.  Adequate BP control off of maintenance medication.          Assessment/Plan:  Patient doing well post op  Cleared by ortho for DC to SNF pending medicine clearance  POD#8 S/P:  1.  Percutaneous screw fixation of bilateral posterior pelvic ring injury.  2.  Open  reduction and internal fixation of right distal femur supracondylar   femur fracture.  3.  Surgical treatment of right subtrochanteric femur fracture with   intramedullary device.  4.  Irrigation and debridement of site of open fracture including soft tissue,   muscle, and bone.  5.  Open reduction and internal fixation of left medial malleolus fracture.  Wt bearing status - NWB BLE x10 weeks  Wound care/Drains - dressings changed every other week by nursing  Future Procedures - none planned  Lovenox: Start 10/30, Duration-until ambulatory > 150'  Sutures/Staples out- 10-14 days post operatively  PT/OT-initiated  Antibiotics: completed  DVT Prophylaxis- TEDS/SCDs/Foot pumps  Parr-none  Case Coordination for Discharge Planning - Disposition SNF vs rehab

## 2017-11-04 NOTE — PROGRESS NOTES
Patient is A&Ox4.   Reports pain to RLE, medicated per MAR.   Immobilizer in place to RLE. ACE bandage wrapped around LLE. Patient denies new onset numbness and tingling. Remains TTWB to BLE. Able to reposition on the bed with SBA.   On 1L O2 NC, denies SOB, chest pain.   Normoactive BS x 4. Tolerating diet. Denies nausea/vomiting. + flatus, + BM. Pt has a Parr catheter, draining clear, yellow urine.   Dressing to BLE incisions c/d/i.   Gauze placed on posterior aspect of RLE, noted skin tear possibly from immobilizer.   Abdominal incision with staples, approximated, intact.   PIV SL.   Updated on poc. Belongings and call light within reach. All needs met at this time.

## 2017-11-04 NOTE — PROGRESS NOTES
"  Trauma/Surgical Progress Note    Author: Trang Moreno Date & Time created: 11/4/2017   2:52 PM     Interval Events:  Going for cystogram today  Possible mcknight DC if cystogram ok  disposition to rehab if acceptable  PT/OT    Review of Systems   Constitutional: Negative for chills and fever.   HENT: Negative for hearing loss.    Respiratory: Negative for cough and shortness of breath.    Cardiovascular: Negative for chest pain and leg swelling.   Gastrointestinal: Positive for abdominal pain. Negative for nausea and vomiting.        Last bowel movement 11/3/2017  Decreased appetite    Genitourinary:        Mcknight in place   Musculoskeletal: Positive for joint pain and myalgias.        Extremity pain, right leg   Skin: Negative for rash.   Neurological: Negative.  Negative for headaches.   Psychiatric/Behavioral: Negative.    All other systems reviewed and are negative.    Hemodynamics:  Blood pressure 120/55, pulse 89, temperature 36.2 °C (97.1 °F), resp. rate 16, height 1.499 m (4' 11\"), weight 76.7 kg (169 lb 1.5 oz), SpO2 90 %, not currently breastfeeding.     Respiratory:    Respiration: 16, Pulse Oximetry: 90 %     Work Of Breathing / Effort: Mild  RUL Breath Sounds: Clear, RML Breath Sounds: Clear, RLL Breath Sounds: Diminished, KEYUR Breath Sounds: Clear, LLL Breath Sounds: Diminished  Fluids:    Intake/Output Summary (Last 24 hours) at 11/04/17 1452  Last data filed at 11/04/17 0400   Gross per 24 hour   Intake              360 ml   Output             1790 ml   Net            -1430 ml     Admit Weight: 68.1 kg (150 lb 2.1 oz)  Current      Physical Exam   Constitutional: She is oriented to person, place, and time. Vital signs are normal. She appears well-developed and well-nourished. She does not appear ill. No distress.   HENT:   Head: Atraumatic.   Eyes: Conjunctivae are normal.   Neck: Trachea normal and normal range of motion. No JVD present.   Cardiovascular: Normal rate, regular rhythm, intact distal " pulses and normal pulses.    Pulmonary/Chest: Effort normal and breath sounds normal. No accessory muscle usage. No respiratory distress. She exhibits no tenderness.   Abdominal: Soft. Normal appearance. She exhibits no distension. There is no tenderness.   Surgical incision with serous drainage  Staples well approximated   Genitourinary:   Genitourinary Comments: Parr to gravity   Musculoskeletal: She exhibits tenderness (Pelvis and bilateral lower extremities). She exhibits no deformity.   Dressing to bilateral lower extremities in place.  Hinged brace left lower extremity    Neurological: She is alert and oriented to person, place, and time. GCS eye subscore is 4. GCS verbal subscore is 5. GCS motor subscore is 6.   Skin: Skin is warm and dry.   Psychiatric: She has a normal mood and affect. Her speech is normal and behavior is normal.   Nursing note and vitals reviewed.      Medical Decision Making/Problem List:    Active Hospital Problems    Diagnosis   • Multiple fractures of pelvis (CMS-HCC) [S32.810A]     Priority: High     Bilateral superior and inferior pubic rami fractures. Displaced on the left.   There is likely fracture of at least the right sacral alar.   Possible fracture of the right posterior acetabulum.  10/26 - Percutaneous screw fixation  Weight bearing status - TTWB BLE.      Emory Bajwa MD. Orthopedic surgery     • Anemia associated with acute blood loss [D62]     Priority: Medium     10/28 -Transfuse 1 unit PRBC.   - Iron replacement per pharmacy kinetics  Transfuse 1 unit PRBC if Hb < 7.0.   11/3 stablized     • Closed nondisplaced fracture of lateral malleolus of left fibula [S82.65XA]     Priority: Medium     Minimally displaced  Small adjacent puncture wound.  10/26 - ORIF  Weight bearing status - TTWB.  Emory Bajwa MD. Orthopedic Surgery     • Type 2 diabetes mellitus (CMS-HCC) [E11.9]     Priority: Medium     Chronic condition treated with metformin 1000 mg twice a day..  10/29 -  HbA1C - 6.0 (126)  10/30 - DC Lantus. Resume maintenance medication at 500 mg twice a day. Continue High ISS and serial BS..  Adequate glycemic control       • Closed fracture of right femur (CMS-HCC) [S72.91XA]     Priority: Medium      Comminuted fractures of the proximal and distal femur, right .  10/26 - ORIF  Weight bearing status - TTWB BLE.  Emory Bajwa MD. Orthopedic surgery     • Spleen laceration [S36.039A]     Priority: Medium     Small grade 1 laceration, lateral inferior aspect of the spleen.  Trend abdominal exam and laboratory studies   11/5 staple removal from abdominal wound     • Traumatic rupture of bladder [S37.29XA]     Priority: Medium     Intra-peritoneal rupture  10/25 - Layered closure  Parr to remain in place for 10 days  11/4 cystogram  Trino Tsang MD. Trauma surgery      • Hypothyroid [E03.9]     Priority: Low     Chronic condition treated with synthroid.  Resume maintenance medication.       • Hyperlipidemia [E78.5]     Priority: Low     Chronic condition treated with simvastatin.  Resume maintenance medication.       • No contraindication to deep vein thrombosis (DVT) prophylaxis [Z78.9]     Priority: Low     Systemic anticoagulation contraindicated secondary to elevated bleeding risk.  RAP score 10  10/29 - Truama duplex LE study negative   10/30 - Initiate pharmacological DVT prophylaxis     • HTN (hypertension) [I10]     Priority: Low     Chronic condition treated with lisinopril.  Adequate BP control off of maintenance medication.        Core Measures & Quality Metrics:  Labs reviewed, Medications reviewed and Radiology images reviewed  Parr catheter: Urologic Surgery or Other Surgery on Contiguous Structures of the Genitourinary Tract or Studies and Urinary Tract Retention or Urinary Tract Obstruction      DVT Prophylaxis: Enoxaparin (Lovenox)    Ulcer prophylaxis: Not indicated    Assessed for rehab: Patient was assess for and/or received rehabilitation services during this  hospitalization    YUNI Score  Discussed patient condition with RN, Patient and trauma surgery. Dr. Lo

## 2017-11-04 NOTE — CARE PLAN
Problem: Venous Thromboembolism (VTW)/Deep Vein Thrombosis (DVT) Prevention:  Goal: Patient will participate in Venous Thrombosis (VTE)/Deep Vein Thrombosis (DVT)Prevention Measures  Outcome: PROGRESSING AS EXPECTED  SCDs in place. ROM encouraged.     Problem: Pain Management  Goal: Pain level will decrease to patient's comfort goal  Outcome: PROGRESSING AS EXPECTED  Pain managed with Oxycodone 10 mg PRN

## 2017-11-04 NOTE — PROGRESS NOTES
AO x 4, assistance provided to turn in bed and reposition.  Trapeze in use as well.  Immobilizer to R leg, incision with c.d.i dressing.  Ankle splint to L leg, c.d.i.  Bilat feet warm.  Midline abdomen incision well approximated.  Parr in place, urine hazy.  Plan of care discussed, questions answered, verbalized understanding.

## 2017-11-05 PROBLEM — D62 ANEMIA ASSOCIATED WITH ACUTE BLOOD LOSS: Status: RESOLVED | Noted: 2017-10-28 | Resolved: 2017-11-05

## 2017-11-05 LAB
GLUCOSE BLD-MCNC: 115 MG/DL (ref 65–99)
GLUCOSE BLD-MCNC: 128 MG/DL (ref 65–99)
GLUCOSE BLD-MCNC: 145 MG/DL (ref 65–99)
MAGNESIUM SERPL-MCNC: 1.9 MG/DL (ref 1.5–2.5)
PHOSPHATE SERPL-MCNC: 4.1 MG/DL (ref 2.5–4.5)

## 2017-11-05 PROCEDURE — 700102 HCHG RX REV CODE 250 W/ 637 OVERRIDE(OP): Performed by: SURGERY

## 2017-11-05 PROCEDURE — A9270 NON-COVERED ITEM OR SERVICE: HCPCS | Performed by: NURSE PRACTITIONER

## 2017-11-05 PROCEDURE — 36415 COLL VENOUS BLD VENIPUNCTURE: CPT

## 2017-11-05 PROCEDURE — 770001 HCHG ROOM/CARE - MED/SURG/GYN PRIV*

## 2017-11-05 PROCEDURE — A9270 NON-COVERED ITEM OR SERVICE: HCPCS | Performed by: SURGERY

## 2017-11-05 PROCEDURE — 700112 HCHG RX REV CODE 229: Performed by: SURGERY

## 2017-11-05 PROCEDURE — 84100 ASSAY OF PHOSPHORUS: CPT

## 2017-11-05 PROCEDURE — 700111 HCHG RX REV CODE 636 W/ 250 OVERRIDE (IP): Performed by: SURGERY

## 2017-11-05 PROCEDURE — 700102 HCHG RX REV CODE 250 W/ 637 OVERRIDE(OP): Performed by: NURSE PRACTITIONER

## 2017-11-05 PROCEDURE — 83735 ASSAY OF MAGNESIUM: CPT

## 2017-11-05 PROCEDURE — 82962 GLUCOSE BLOOD TEST: CPT | Mod: 91

## 2017-11-05 RX ADMIN — METAXALONE 800 MG: 800 TABLET ORAL at 08:46

## 2017-11-05 RX ADMIN — GABAPENTIN 100 MG: 100 CAPSULE ORAL at 21:51

## 2017-11-05 RX ADMIN — METFORMIN HYDROCHLORIDE 500 MG: 500 TABLET, EXTENDED RELEASE ORAL at 08:41

## 2017-11-05 RX ADMIN — DIBASIC SODIUM PHOSPHATE, MONOBASIC POTASSIUM PHOSPHATE AND MONOBASIC SODIUM PHOSPHATE 2 TABLET: 852; 155; 130 TABLET ORAL at 14:39

## 2017-11-05 RX ADMIN — GABAPENTIN 100 MG: 100 CAPSULE ORAL at 14:36

## 2017-11-05 RX ADMIN — OXYCODONE HYDROCHLORIDE 10 MG: 10 TABLET ORAL at 05:34

## 2017-11-05 RX ADMIN — ENOXAPARIN SODIUM 30 MG: 100 INJECTION SUBCUTANEOUS at 08:47

## 2017-11-05 RX ADMIN — METFORMIN HYDROCHLORIDE 500 MG: 500 TABLET, EXTENDED RELEASE ORAL at 17:14

## 2017-11-05 RX ADMIN — DIBASIC SODIUM PHOSPHATE, MONOBASIC POTASSIUM PHOSPHATE AND MONOBASIC SODIUM PHOSPHATE 2 TABLET: 852; 155; 130 TABLET ORAL at 21:51

## 2017-11-05 RX ADMIN — OXYCODONE HYDROCHLORIDE 10 MG: 10 TABLET ORAL at 14:36

## 2017-11-05 RX ADMIN — ENOXAPARIN SODIUM 30 MG: 100 INJECTION SUBCUTANEOUS at 21:51

## 2017-11-05 RX ADMIN — SIMVASTATIN 20 MG: 10 TABLET, FILM COATED ORAL at 21:51

## 2017-11-05 RX ADMIN — DIBASIC SODIUM PHOSPHATE, MONOBASIC POTASSIUM PHOSPHATE AND MONOBASIC SODIUM PHOSPHATE 2 TABLET: 852; 155; 130 TABLET ORAL at 08:45

## 2017-11-05 RX ADMIN — OXYCODONE HYDROCHLORIDE 10 MG: 10 TABLET ORAL at 01:48

## 2017-11-05 RX ADMIN — OXYCODONE HYDROCHLORIDE 10 MG: 10 TABLET ORAL at 21:51

## 2017-11-05 RX ADMIN — DOCUSATE SODIUM 100 MG: 100 CAPSULE ORAL at 08:41

## 2017-11-05 RX ADMIN — METAXALONE 800 MG: 800 TABLET ORAL at 14:36

## 2017-11-05 RX ADMIN — LEVOTHYROXINE SODIUM 75 MCG: 75 TABLET ORAL at 05:34

## 2017-11-05 RX ADMIN — POLYETHYLENE GLYCOL 3350 1 PACKET: 17 POWDER, FOR SOLUTION ORAL at 08:42

## 2017-11-05 RX ADMIN — GABAPENTIN 100 MG: 100 CAPSULE ORAL at 08:46

## 2017-11-05 RX ADMIN — METAXALONE 800 MG: 800 TABLET ORAL at 21:51

## 2017-11-05 ASSESSMENT — ENCOUNTER SYMPTOMS
COUGH: 0
MYALGIAS: 1
VOMITING: 0
CHILLS: 0
PSYCHIATRIC NEGATIVE: 1
ABDOMINAL PAIN: 1
NAUSEA: 0
SHORTNESS OF BREATH: 0
NEUROLOGICAL NEGATIVE: 1
FEVER: 0
HEADACHES: 0

## 2017-11-05 ASSESSMENT — PAIN SCALES - GENERAL
PAINLEVEL_OUTOF10: 10
PAINLEVEL_OUTOF10: 7
PAINLEVEL_OUTOF10: 4
PAINLEVEL_OUTOF10: 0
PAINLEVEL_OUTOF10: 0
PAINLEVEL_OUTOF10: 4
PAINLEVEL_OUTOF10: 5

## 2017-11-05 NOTE — PROGRESS NOTES
"  Trauma/Surgical Progress Note    Author: Trang Moreno Date & Time created: 11/5/2017   1:53 PM     Interval Events:  Parr to be removed  Family at bedside  disposition to rehab if able to void  Staple removal today      Review of Systems   Constitutional: Negative for chills and fever.   HENT: Negative for hearing loss.    Respiratory: Negative for cough and shortness of breath.    Cardiovascular: Negative for chest pain and leg swelling.   Gastrointestinal: Positive for abdominal pain. Negative for nausea and vomiting.        Last bowel movement 11/3/2017  Decreased appetite    Genitourinary:        Parr in place   Musculoskeletal: Positive for joint pain and myalgias.        Extremity pain, right leg   Skin: Negative for rash.   Neurological: Negative.  Negative for headaches.   Psychiatric/Behavioral: Negative.    All other systems reviewed and are negative.    Hemodynamics:  Blood pressure 110/65, pulse 76, temperature 36.4 °C (97.5 °F), resp. rate 17, height 1.499 m (4' 11\"), weight 76.7 kg (169 lb 1.5 oz), SpO2 93 %, not currently breastfeeding.     Respiratory:    Respiration: 17, Pulse Oximetry: 93 %     Work Of Breathing / Effort: Mild  RUL Breath Sounds: Clear, RML Breath Sounds: Clear, RLL Breath Sounds: Diminished, KEYUR Breath Sounds: Clear, LLL Breath Sounds: Diminished  Fluids:    Intake/Output Summary (Last 24 hours) at 11/05/17 1353  Last data filed at 11/05/17 1300   Gross per 24 hour   Intake              320 ml   Output             3150 ml   Net            -2830 ml     Admit Weight: 68.1 kg (150 lb 2.1 oz)  Current      Physical Exam   Constitutional: She is oriented to person, place, and time. Vital signs are normal. She appears well-developed and well-nourished. She does not appear ill. No distress.   HENT:   Head: Atraumatic.   Neck: Trachea normal.   Cardiovascular: Normal rate, regular rhythm, intact distal pulses and normal pulses.    Pulmonary/Chest: Effort normal and breath sounds " normal. No accessory muscle usage. No respiratory distress. She exhibits no tenderness.   Abdominal: Soft. Normal appearance. She exhibits no distension. There is no tenderness.   Surgical incision with serous drainage  Staples well approximated   Genitourinary:   Genitourinary Comments: Parr removed   Musculoskeletal: She exhibits tenderness (Pelvis and bilateral lower extremities). She exhibits no deformity.   Dressing to bilateral lower extremities in place.  Hinged brace left lower extremity    Neurological: She is alert and oriented to person, place, and time.   Skin: Skin is warm and dry.   Psychiatric: She has a normal mood and affect. Her speech is normal and behavior is normal.   Nursing note and vitals reviewed.      Medical Decision Making/Problem List:    Active Hospital Problems    Diagnosis   • Multiple fractures of pelvis (CMS-HCC) [S32.810A]     Priority: High     Bilateral superior and inferior pubic rami fractures. Displaced on the left.   There is likely fracture of at least the right sacral alar.   Possible fracture of the right posterior acetabulum.  10/26 - Percutaneous screw fixation  Weight bearing status - TTWB BLE.      Emory Bajwa MD. Orthopedic surgery     • Closed nondisplaced fracture of lateral malleolus of left fibula [S82.65XA]     Priority: Medium     Minimally displaced  Small adjacent puncture wound.  10/26 - ORIF  Weight bearing status - TTWB.  Emory Bajwa MD. Orthopedic Surgery     • Type 2 diabetes mellitus (CMS-HCC) [E11.9]     Priority: Medium     Chronic condition treated with metformin 1000 mg twice a day..  10/29 - HbA1C - 6.0 (126)  10/30 - DC Lantus. Resume maintenance medication at 500 mg twice a day. Continue High ISS and serial BS..  Adequate glycemic control       • Closed fracture of right femur (CMS-HCC) [S72.91XA]     Priority: Medium      Comminuted fractures of the proximal and distal femur, right .  10/26 - ORIF  Weight bearing status - TTWB BLE.  Emory  Garett DIAZ. Orthopedic surgery     • Spleen laceration [S36.039A]     Priority: Medium     Small grade 1 laceration, lateral inferior aspect of the spleen.  Trend abdominal exam and laboratory studies   11/5 staple removal from abdominal wound     • Traumatic rupture of bladder [S37.29XA]     Priority: Medium     Intra-peritoneal rupture  10/25 - Layered closure  Mcknight to remain in place for 10 days  11/4 cystogram with no leak noted  11/5 mcknight removal trial  Trino Tsang MD. Trauma surgery      • Hypothyroid [E03.9]     Priority: Low     Chronic condition treated with synthroid.  Resume maintenance medication.       • Hyperlipidemia [E78.5]     Priority: Low     Chronic condition treated with simvastatin.  Resume maintenance medication.       • No contraindication to deep vein thrombosis (DVT) prophylaxis [Z78.9]     Priority: Low     Systemic anticoagulation contraindicated secondary to elevated bleeding risk.  RAP score 10  10/29 - Truama duplex LE study negative   10/30 - Initiate pharmacological DVT prophylaxis     • HTN (hypertension) [I10]     Priority: Low     Chronic condition treated with lisinopril.  Adequate BP control off of maintenance medication.        Core Measures & Quality Metrics:  Labs reviewed, Medications reviewed and Radiology images reviewed  Mcknight catheter: Urologic Surgery or Other Surgery on Contiguous Structures of the Genitourinary Tract or Studies and Urinary Tract Retention or Urinary Tract Obstruction      DVT Prophylaxis: Enoxaparin (Lovenox)    Ulcer prophylaxis: Not indicated    Assessed for rehab: Patient was assess for and/or received rehabilitation services during this hospitalization    YUNI Score  Discussed patient condition with RN, Patient and trauma surgery. Dr Lo

## 2017-11-05 NOTE — DISCHARGE PLANNING
Spoke with Andreia at [facility], they have accepted patient pending bed availability.

## 2017-11-05 NOTE — PROGRESS NOTES
AO x 4, pain level currently 4/10 after repositioning and scheduled meds.  BLE elevated on pillows protecting heels.  This RN assisted with ROM and encouraged patient to flex and extend feet while laying in bed.  Splint to LLE intact and RLE immobilizer in place, skin checked beneath brace and brace adjusted for comfort and pressure ulcer prevention. Parr in place, + yellow urine, hazy with sediment. Plan of care discussed, questions answered, verbalized understanding.

## 2017-11-05 NOTE — PROGRESS NOTES
"   Orthopaedic Progress Note    Interval changes:  No interval change  Patient doing well  Cleared for DC by ortho for DC to rehab or SNF    ROS - Patient denies any new issues.  Pain well controlled.    Blood pressure 110/65, pulse 76, temperature 36.4 °C (97.5 °F), resp. rate 17, height 1.499 m (4' 11\"), weight 76.7 kg (169 lb 1.5 oz), SpO2 93 %, not currently breastfeeding.    Patient seen and examined  No acute distress  Breathing non labored  RRR  BLE dressings are clean, dry, and intact. Patient clearly fires tibialis anterior, EHL, and gastrocnemius/soleus. Sensation is intact to light touch throughout superficial peroneal, deep peroneal, tibial, saphenous, and sural nerve distributions. Strong and palpable 2+ dorsalis pedis and posterior tibial pulses with capillary refill less than 2 seconds. No lower leg tenderness or discomfort.      Active Hospital Problems    Diagnosis   • Multiple fractures of pelvis (CMS-HCC) [S32.810A]     Priority: High     Bilateral superior and inferior pubic rami fractures. Displaced on the left.   There is likely fracture of at least the right sacral alar.   Possible fracture of the right posterior acetabulum.  10/26 - Percutaneous screw fixation  Weight bearing status - TTWB BLE.      Emory Bajwa MD. Orthopedic surgery     • Closed nondisplaced fracture of lateral malleolus of left fibula [S82.65XA]     Priority: Medium     Minimally displaced  Small adjacent puncture wound.  10/26 - ORIF  Weight bearing status - TTWB.  Emory Bajwa MD. Orthopedic Surgery     • Type 2 diabetes mellitus (CMS-HCC) [E11.9]     Priority: Medium     Chronic condition treated with metformin 1000 mg twice a day..  10/29 - HbA1C - 6.0 (126)  10/30 - DC Lantus. Resume maintenance medication at 500 mg twice a day. Continue High ISS and serial BS..  Adequate glycemic control       • Closed fracture of right femur (CMS-HCC) [S72.91XA]     Priority: Medium      Comminuted fractures of the proximal and " distal femur, right .  10/26 - ORIF  Weight bearing status - TTWB BLE.  Emory Bajwa MD. Orthopedic surgery     • Spleen laceration [S36.039A]     Priority: Medium     Small grade 1 laceration, lateral inferior aspect of the spleen.  Trend abdominal exam and laboratory studies   11/5 staple removal from abdominal wound     • Traumatic rupture of bladder [S37.29XA]     Priority: Medium     Intra-peritoneal rupture  10/25 - Layered closure  Parr to remain in place for 10 days  11/4 cystogram  Trino Tsang MD. Trauma surgery      • Hypothyroid [E03.9]     Priority: Low     Chronic condition treated with synthroid.  Resume maintenance medication.       • Hyperlipidemia [E78.5]     Priority: Low     Chronic condition treated with simvastatin.  Resume maintenance medication.       • No contraindication to deep vein thrombosis (DVT) prophylaxis [Z78.9]     Priority: Low     Systemic anticoagulation contraindicated secondary to elevated bleeding risk.  RAP score 10  10/29 - Truama duplex LE study negative   10/30 - Initiate pharmacological DVT prophylaxis     • HTN (hypertension) [I10]     Priority: Low     Chronic condition treated with lisinopril.  Adequate BP control off of maintenance medication.          Assessment/Plan:  Patient doing well post op  Cleared by ortho for DC to SNF pending medicine clearance  POD#10 S/P:  1.  Percutaneous screw fixation of bilateral posterior pelvic ring injury.  2.  Open reduction and internal fixation of right distal femur supracondylar   femur fracture.  3.  Surgical treatment of right subtrochanteric femur fracture with   intramedullary device.  4.  Irrigation and debridement of site of open fracture including soft tissue,   muscle, and bone.  5.  Open reduction and internal fixation of left medial malleolus fracture.  Wt bearing status - NWB BLE x10 weeks  Wound care/Drains - dressings changed every other week by nursing  Future Procedures - none planned  Lovenox: Start 10/30,  Duration-until ambulatory > 150'  Sutures/Staples out- 10-14 days post operatively  PT/OT-initiated  Antibiotics: completed  DVT Prophylaxis- TEDS/SCDs/Foot pumps  Parr-none  Case Coordination for Discharge Planning - Disposition SNF vs rehab

## 2017-11-05 NOTE — PROGRESS NOTES
Patient is A&Ox4.   Reports pain to RLE, medicated per MAR   Immobilizer in place to RLE. ACE wrap around LLE. Patient remains TTWB to BLE. Requires x 2 assist to reposition. Denies numbness and tingling.   On 1L O2 NC, denies SOB, chest pain.   Normoactive BS x 4. Tolerating diet. Denies nausea/vomiting. + flatus, LBM 11/3. Pt has a Parr catheter due to be removed following Cystogram results.   Dressing to RLE c/d/i.   Midline abdominal incision with staples, approximated   PIV SL. Flushed with Saline, patent.   Updated on plan of care. Belongings and call light within reach. All needs met at this time.

## 2017-11-05 NOTE — CARE PLAN
Problem: Venous Thromboembolism (VTW)/Deep Vein Thrombosis (DVT) Prevention:  Goal: Patient will participate in Venous Thrombosis (VTE)/Deep Vein Thrombosis (DVT)Prevention Measures  Outcome: PROGRESSING AS EXPECTED  SCDs in place. Lovenox administered per MAR     Problem: Pain Management  Goal: Pain level will decrease to patient's comfort goal  Outcome: PROGRESSING AS EXPECTED  Pain managed with oxycodone 10 mg PRN

## 2017-11-05 NOTE — PROGRESS NOTES
Patient refused bed alarm.  Patient educated on fall risks and importance of using call light.  Discussed fall prevention, safety, and calling for assistance.  Patient verbalized understanding and continued to refuse bed alarm. Fall precautions in place: treaded slipper socks, bed is in low position, personal belongings, wastebasket, call light, and phone are within patient's reach.

## 2017-11-06 LAB
GLUCOSE BLD-MCNC: 104 MG/DL (ref 65–99)
GLUCOSE BLD-MCNC: 111 MG/DL (ref 65–99)
GLUCOSE BLD-MCNC: 123 MG/DL (ref 65–99)
GLUCOSE BLD-MCNC: 130 MG/DL (ref 65–99)
GLUCOSE BLD-MCNC: 176 MG/DL (ref 65–99)
GLUCOSE BLD-MCNC: 73 MG/DL (ref 65–99)

## 2017-11-06 PROCEDURE — 700111 HCHG RX REV CODE 636 W/ 250 OVERRIDE (IP): Performed by: SURGERY

## 2017-11-06 PROCEDURE — 82962 GLUCOSE BLOOD TEST: CPT

## 2017-11-06 PROCEDURE — 770001 HCHG ROOM/CARE - MED/SURG/GYN PRIV*

## 2017-11-06 PROCEDURE — 700102 HCHG RX REV CODE 250 W/ 637 OVERRIDE(OP): Performed by: SURGERY

## 2017-11-06 PROCEDURE — A9270 NON-COVERED ITEM OR SERVICE: HCPCS | Performed by: NURSE PRACTITIONER

## 2017-11-06 PROCEDURE — 700102 HCHG RX REV CODE 250 W/ 637 OVERRIDE(OP): Performed by: NURSE PRACTITIONER

## 2017-11-06 PROCEDURE — A9270 NON-COVERED ITEM OR SERVICE: HCPCS | Performed by: SURGERY

## 2017-11-06 PROCEDURE — 97535 SELF CARE MNGMENT TRAINING: CPT

## 2017-11-06 PROCEDURE — 700112 HCHG RX REV CODE 229: Performed by: SURGERY

## 2017-11-06 PROCEDURE — 97530 THERAPEUTIC ACTIVITIES: CPT

## 2017-11-06 RX ADMIN — DIBASIC SODIUM PHOSPHATE, MONOBASIC POTASSIUM PHOSPHATE AND MONOBASIC SODIUM PHOSPHATE 2 TABLET: 852; 155; 130 TABLET ORAL at 16:03

## 2017-11-06 RX ADMIN — OXYCODONE HYDROCHLORIDE 10 MG: 10 TABLET ORAL at 20:47

## 2017-11-06 RX ADMIN — OXYCODONE HYDROCHLORIDE 10 MG: 10 TABLET ORAL at 03:11

## 2017-11-06 RX ADMIN — LEVOTHYROXINE SODIUM 75 MCG: 75 TABLET ORAL at 06:21

## 2017-11-06 RX ADMIN — SIMVASTATIN 20 MG: 10 TABLET, FILM COATED ORAL at 20:47

## 2017-11-06 RX ADMIN — METAXALONE 800 MG: 800 TABLET ORAL at 09:53

## 2017-11-06 RX ADMIN — METAXALONE 800 MG: 800 TABLET ORAL at 20:47

## 2017-11-06 RX ADMIN — DOCUSATE SODIUM 100 MG: 100 CAPSULE ORAL at 09:47

## 2017-11-06 RX ADMIN — OXYCODONE HYDROCHLORIDE 10 MG: 10 TABLET ORAL at 06:21

## 2017-11-06 RX ADMIN — DIBASIC SODIUM PHOSPHATE, MONOBASIC POTASSIUM PHOSPHATE AND MONOBASIC SODIUM PHOSPHATE 2 TABLET: 852; 155; 130 TABLET ORAL at 09:56

## 2017-11-06 RX ADMIN — ENOXAPARIN SODIUM 30 MG: 100 INJECTION SUBCUTANEOUS at 09:47

## 2017-11-06 RX ADMIN — DOCUSATE SODIUM 100 MG: 100 CAPSULE ORAL at 20:47

## 2017-11-06 RX ADMIN — ENOXAPARIN SODIUM 30 MG: 100 INJECTION SUBCUTANEOUS at 20:47

## 2017-11-06 RX ADMIN — GABAPENTIN 100 MG: 100 CAPSULE ORAL at 20:47

## 2017-11-06 RX ADMIN — GABAPENTIN 100 MG: 100 CAPSULE ORAL at 16:03

## 2017-11-06 RX ADMIN — METAXALONE 800 MG: 800 TABLET ORAL at 16:03

## 2017-11-06 RX ADMIN — OXYCODONE HYDROCHLORIDE 10 MG: 10 TABLET ORAL at 14:05

## 2017-11-06 RX ADMIN — METFORMIN HYDROCHLORIDE 500 MG: 500 TABLET, EXTENDED RELEASE ORAL at 09:53

## 2017-11-06 RX ADMIN — GABAPENTIN 100 MG: 100 CAPSULE ORAL at 09:52

## 2017-11-06 RX ADMIN — INSULIN LISPRO 3 UNITS: 100 INJECTION, SOLUTION INTRAVENOUS; SUBCUTANEOUS at 17:23

## 2017-11-06 RX ADMIN — STANDARDIZED SENNA CONCENTRATE AND DOCUSATE SODIUM 1 TABLET: 8.6; 5 TABLET, FILM COATED ORAL at 20:47

## 2017-11-06 RX ADMIN — DIBASIC SODIUM PHOSPHATE, MONOBASIC POTASSIUM PHOSPHATE AND MONOBASIC SODIUM PHOSPHATE 2 TABLET: 852; 155; 130 TABLET ORAL at 20:47

## 2017-11-06 RX ADMIN — POLYETHYLENE GLYCOL 3350 1 PACKET: 17 POWDER, FOR SOLUTION ORAL at 20:47

## 2017-11-06 RX ADMIN — OXYCODONE HYDROCHLORIDE 10 MG: 10 TABLET ORAL at 09:58

## 2017-11-06 RX ADMIN — METFORMIN HYDROCHLORIDE 500 MG: 500 TABLET, EXTENDED RELEASE ORAL at 17:27

## 2017-11-06 ASSESSMENT — COGNITIVE AND FUNCTIONAL STATUS - GENERAL
CLIMB 3 TO 5 STEPS WITH RAILING: TOTAL
MOVING FROM LYING ON BACK TO SITTING ON SIDE OF FLAT BED: UNABLE
SUGGESTED CMS G CODE MODIFIER MOBILITY: CL
STANDING UP FROM CHAIR USING ARMS: A LITTLE
HELP NEEDED FOR BATHING: A LOT
DRESSING REGULAR UPPER BODY CLOTHING: A LITTLE
MOBILITY SCORE: 10
SUGGESTED CMS G CODE MODIFIER DAILY ACTIVITY: CK
TURNING FROM BACK TO SIDE WHILE IN FLAT BAD: A LITTLE
PERSONAL GROOMING: A LITTLE
DRESSING REGULAR LOWER BODY CLOTHING: A LOT
TOILETING: A LOT
WALKING IN HOSPITAL ROOM: TOTAL
DAILY ACTIVITIY SCORE: 16
MOVING TO AND FROM BED TO CHAIR: UNABLE

## 2017-11-06 ASSESSMENT — ENCOUNTER SYMPTOMS
ABDOMINAL PAIN: 1
PSYCHIATRIC NEGATIVE: 1
NAUSEA: 0
NEUROLOGICAL NEGATIVE: 1
SHORTNESS OF BREATH: 0
FEVER: 0
HEADACHES: 0
CHILLS: 0
VOMITING: 0
COUGH: 0

## 2017-11-06 ASSESSMENT — PAIN SCALES - GENERAL
PAINLEVEL_OUTOF10: 10
PAINLEVEL_OUTOF10: 7
PAINLEVEL_OUTOF10: 7
PAINLEVEL_OUTOF10: 5
PAINLEVEL_OUTOF10: 6
PAINLEVEL_OUTOF10: 6

## 2017-11-06 ASSESSMENT — GAIT ASSESSMENTS: GAIT LEVEL OF ASSIST: UNABLE TO PARTICIPATE

## 2017-11-06 NOTE — CARE PLAN
Problem: Urinary Elimination:  Goal: Ability to reestablish a normal urinary elimination pattern will improve    Intervention: Assess and monitor for signs and symptoms of urinary retention  Patient able to void post mcknight removal.  Used bedpan, but pan missed and linens changed.

## 2017-11-06 NOTE — PROGRESS NOTES
"   Orthopaedic Progress Note    Interval changes:  No interval change  Patient doing well  Cleared for DC by ortho for DC to rehab or SNF    ROS - Patient denies any new issues.  Pain well controlled.    Blood pressure 122/50, pulse 97, temperature 36.7 °C (98 °F), resp. rate 16, height 1.499 m (4' 11\"), weight 76.7 kg (169 lb 1.5 oz), SpO2 90 %, not currently breastfeeding.    Patient seen and examined  No acute distress  Breathing non labored  RRR  BLE dressing changed, surgical incisions are well approximated and are dry and clean.  There is no erythema, induration, or signs of infection at any of the incision sites. Patient clearly fires tibialis anterior, EHL, and gastrocnemius/soleus. Sensation is intact to light touch throughout superficial peroneal, deep peroneal, tibial, saphenous, and sural nerve distributions. Strong and palpable 2+ dorsalis pedis and posterior tibial pulses with capillary refill less than 2 seconds. No lower leg tenderness or discomfort.      Active Hospital Problems    Diagnosis   • Multiple fractures of pelvis (CMS-HCC) [S32.810A]     Priority: High     Bilateral superior and inferior pubic rami fractures. Displaced on the left.   There is likely fracture of at least the right sacral alar.   Possible fracture of the right posterior acetabulum.  10/26 - Percutaneous screw fixation  Weight bearing status - TTWB BLE.      Emory Bajwa MD. Orthopedic surgery     • Closed nondisplaced fracture of lateral malleolus of left fibula [S82.65XA]     Priority: Medium     Minimally displaced  Small adjacent puncture wound.  10/26 - ORIF  Weight bearing status - TTWB.  Emory Bajwa MD. Orthopedic Surgery     • Type 2 diabetes mellitus (CMS-HCC) [E11.9]     Priority: Medium     Chronic condition treated with metformin 1000 mg twice a day..  10/29 - HbA1C - 6.0 (126)  10/30 - DC Lantus. Resume maintenance medication at 500 mg twice a day. Continue High ISS and serial BS..  Adequate glycemic control  "      • Closed fracture of right femur (CMS-HCC) [S72.91XA]     Priority: Medium      Comminuted fractures of the proximal and distal femur, right .  10/26 - ORIF  Weight bearing status - TTWB BLE.  Emory Bajwa MD. Orthopedic surgery     • Spleen laceration [S36.039A]     Priority: Medium     Small grade 1 laceration, lateral inferior aspect of the spleen.  Trend abdominal exam and laboratory studies   11/5 staple removal from abdominal wound     • Traumatic rupture of bladder [S37.29XA]     Priority: Medium     Intra-peritoneal rupture  10/25 - Layered closure  Mcknight to remain in place for 10 days  11/4 cystogram with no leak noted  11/5 mcknight removal trial  Trino Tsang MD. Trauma surgery      • Hypothyroid [E03.9]     Priority: Low     Chronic condition treated with synthroid.  Resume maintenance medication.       • Hyperlipidemia [E78.5]     Priority: Low     Chronic condition treated with simvastatin.  Resume maintenance medication.       • No contraindication to deep vein thrombosis (DVT) prophylaxis [Z78.9]     Priority: Low     Systemic anticoagulation contraindicated secondary to elevated bleeding risk.  RAP score 10  10/29 - Truama duplex LE study negative   10/30 - Initiate pharmacological DVT prophylaxis     • HTN (hypertension) [I10]     Priority: Low     Chronic condition treated with lisinopril.  Adequate BP control off of maintenance medication.          Assessment/Plan:  Patient doing well post op  Cleared by ortho for DC to SNF pending medicine clearance  POD#11 S/P:  1.  Percutaneous screw fixation of bilateral posterior pelvic ring injury.  2.  Open reduction and internal fixation of right distal femur supracondylar   femur fracture.  3.  Surgical treatment of right subtrochanteric femur fracture with   intramedullary device.  4.  Irrigation and debridement of site of open fracture including soft tissue,   muscle, and bone.  5.  Open reduction and internal fixation of left medial malleolus  fracture.  Wt bearing status - NWB BLE x10 weeks  Wound care/Drains - dressings changed every other day by nursing  Future Procedures - none planned  Lovenox: Start 10/30, Duration-until ambulatory > 150'  Sutures/Staples out-  14 days post operatively  PT/OT-initiated  Antibiotics: completed  DVT Prophylaxis- TEDS/SCDs/Foot pumps  Parr-none  Case Coordination for Discharge Planning - Disposition SNF vs rehab

## 2017-11-06 NOTE — THERAPY
"Physical Therapy Treatment completed.   Bed Mobility:  Supine to Sit: Minimal Assist  Transfers: Sit to Stand: Unable to Participate  Gait: Level Of Assist: Unable to Participate     Plan of Care: Will benefit from Physical Therapy 3 times per week  Discharge Recommendations: Equipment: Will Continue to Assess for Equipment Needs.     See \"Rehab Therapy-Acute\" Patient Summary Report for complete documentation.     Pt presenting today w/ improved functional mobility. With cues for sequencing, pt able to demonstrate bed mobility w/ decreased pain. Pt able to remember sequencing for SB transfer to w/c still requiring ModA for set up of SB. Pt also requiring the R LE be elevated during transfer secondary to increased pain. Pt able to maneuver around room in w/c w/ no cues needed. As per initial eval, pt will benefit from post acute therapy.  "

## 2017-11-06 NOTE — PROGRESS NOTES
Patient is A&Ox4.   Reports pain to RLE, medicated per MAR   Immobilizer in place to RLE. ACE wrap around LLE. Patient remains TTWB to BLE. Requires x 2 assist to reposition. Denies numbness and tingling.   On 1L O2 NC, denies SOB, chest pain.   Normoactive BS x 4. Tolerating diet. Denies nausea/vomiting. + flatus, LBM 11/5. Pt is voiding q shift.   Dressing to RLE c/d/i.   Midline abdominal incision with steristips, approximated   PIV SL. Flushed with Saline, patent.   Updated on plan of care. Belongings and call light within reach. All needs met at this time.

## 2017-11-06 NOTE — DISCHARGE PLANNING
PILY left VM for EDUAR Chapman in regards to pt pending bed availability with [facility]. SW to f/u.

## 2017-11-06 NOTE — THERAPY
"Occupational Therapy Treatment completed with focus on ADLs, ADL transfers and patient education.  Functional Status:  Pt seen for OT tx today.  Pt was pleasant and cooperative throughout the session.  Continues to be limited by weakness, endurance, and self care.  Pt completed LB dressing with max A, UB dressing with min A, CGA for sponge bath w/c level and seated gr/hy with CGA.  Needing no cues to initiate, follow through, and problem solve. Pt would benefit from continued inpt OT to increase independence with functional transfers, functional endurance, and ADLs.  Plan of Care: Will benefit from Occupational Therapy 3 times per week  Discharge Recommendations:  Equipment Will Continue to Assess for Equipment Needs. Post-acute therapy Discharge to a transitional care facility for continued skilled therapy services.    See \"Rehab Therapy-Acute\" Patient Summary Report for complete documentation.   "

## 2017-11-06 NOTE — CARE PLAN
Problem: Safety  Goal: Will remain free from injury  Outcome: PROGRESSING AS EXPECTED  Safety precautions in place. Bed in locked/low position. 2 side rails up. Treaded socks. Call light in reach, calls appropriately. Hourly rounding practiced.    Problem: Pain Management  Goal: Pain level will decrease to patient's comfort goal  Outcome: PROGRESSING AS EXPECTED  Pain managed with Oxycodone PRN

## 2017-11-06 NOTE — PROGRESS NOTES
Pt report received. Assumed care of pt. Pt resting in bed with no signs of distress, call light within reach, personal items available. Will continue to monitor.

## 2017-11-06 NOTE — CARE PLAN
Problem: Nutritional:  Goal: Achieve adequate nutritional intake  Patient will consume 50% of meals and supplements   Outcome: PROGRESSING AS EXPECTED  Variable po intake of DM, low fiber; GI soft diet.  Per ADLs, po intake fluctuates   Intervention: Monitor PO intake, weights, and laboratory values  Record percentage of meals conusmed in ADLs to help monitor po adequacy    Intervention: Collaborate with transitional care team and interdisciplinary team to meet patient's needs  RD following

## 2017-11-06 NOTE — PROGRESS NOTES
Refuses BA, educated on risk to fall, verbalizes understanding and still declines. Call light in reach, calls appropriately for assistance. Charge RN notified.

## 2017-11-06 NOTE — PROGRESS NOTES
"  Trauma/Surgical Progress Note    Author: Trang Moreno Date & Time created: 11/6/2017   1:48 PM     Interval Events:  Voiding by self  Awaiting disposition to accepting rehab   fatigued this am from sitting up and ambulating  Staples removed    Review of Systems   Constitutional: Positive for malaise/fatigue. Negative for chills and fever.   HENT: Negative for hearing loss.    Respiratory: Negative for cough and shortness of breath.    Cardiovascular: Negative for chest pain and leg swelling.   Gastrointestinal: Positive for abdominal pain. Negative for nausea and vomiting.        Last bowel movement 11/5  Decreased appetite   Genitourinary:        Voiding   Musculoskeletal: Positive for joint pain.        Extremity pain, right leg when moving   Skin: Negative for rash.   Neurological: Negative.  Negative for headaches.   Psychiatric/Behavioral: Negative.    All other systems reviewed and are negative.    Hemodynamics:  Blood pressure 122/50, pulse 97, temperature 36.7 °C (98 °F), resp. rate 16, height 1.499 m (4' 11\"), weight 76.7 kg (169 lb 1.5 oz), SpO2 90 %, not currently breastfeeding.     Respiratory:    Respiration: 16, Pulse Oximetry: 90 %        RUL Breath Sounds: Clear, RML Breath Sounds: Clear, RLL Breath Sounds: Diminished, KEYUR Breath Sounds: Clear, LLL Breath Sounds: Diminished  Fluids:    Intake/Output Summary (Last 24 hours) at 11/06/17 1348  Last data filed at 11/06/17 0800   Gross per 24 hour   Intake             1070 ml   Output                0 ml   Net             1070 ml     Admit Weight: 68.1 kg (150 lb 2.1 oz)  Current      Physical Exam   Constitutional: She is oriented to person, place, and time. Vital signs are normal. She appears well-developed and well-nourished. She does not appear ill. No distress.   HENT:   Head: Atraumatic.   Neck: Trachea normal.   Cardiovascular: Normal rate, regular rhythm, intact distal pulses and normal pulses.    Pulmonary/Chest: Effort normal and breath " sounds normal. No accessory muscle usage. No respiratory distress. She exhibits no tenderness.   Abdominal: Soft. Normal appearance. She exhibits no distension. There is no tenderness.   Surgical incision well approximated, steri strips  Staples removed     Genitourinary:   Genitourinary Comments: Parr removed   Musculoskeletal: Normal range of motion. She exhibits no edema, tenderness or deformity.   Dressing to bilateral lower extremities in place.  Hinged brace left lower extremity    Neurological: She is alert and oriented to person, place, and time.   Left eye sluggish blink   Skin: Skin is warm and dry. She is not diaphoretic.   Psychiatric: She has a normal mood and affect. Her speech is normal and behavior is normal.   Nursing note and vitals reviewed.      Medical Decision Making/Problem List:    Active Hospital Problems    Diagnosis   • Multiple fractures of pelvis (CMS-HCC) [S32.810A]     Priority: High     Bilateral superior and inferior pubic rami fractures. Displaced on the left.   There is likely fracture of at least the right sacral alar.   Possible fracture of the right posterior acetabulum.  10/26 - Percutaneous screw fixation  Weight bearing status - TTWB BLE.      Emory Bajwa MD. Orthopedic surgery     • Closed nondisplaced fracture of lateral malleolus of left fibula [S82.65XA]     Priority: Medium     Minimally displaced  Small adjacent puncture wound.  10/26 - ORIF  Weight bearing status - TTWB.  Emory Bajwa MD. Orthopedic Surgery     • Type 2 diabetes mellitus (CMS-HCC) [E11.9]     Priority: Medium     Chronic condition treated with metformin 1000 mg twice a day..  10/29 - HbA1C - 6.0 (126)  10/30 - DC Lantus. Resume maintenance medication at 500 mg twice a day. Continue High ISS and serial BS..  Adequate glycemic control       • Closed fracture of right femur (CMS-HCC) [S72.91XA]     Priority: Medium      Comminuted fractures of the proximal and distal femur, right .  10/26 -  ORIF  Weight bearing status - TTWB BLE.  Emory Bajwa MD. Orthopedic surgery     • Spleen laceration [S36.039A]     Priority: Medium     Small grade 1 laceration, lateral inferior aspect of the spleen.  Trend abdominal exam and laboratory studies   11/5 staple removal from abdominal wound     • Traumatic rupture of bladder [S37.29XA]     Priority: Medium     Intra-peritoneal rupture  10/25 - Layered closure  Mcknight to remain in place for 10 days  11/4 cystogram with no leak noted  11/5 mcknight removal trial  Trino Tsang MD. Trauma surgery      • Hypothyroid [E03.9]     Priority: Low     Chronic condition treated with synthroid.  Resume maintenance medication.       • Hyperlipidemia [E78.5]     Priority: Low     Chronic condition treated with simvastatin.  Resume maintenance medication.       • No contraindication to deep vein thrombosis (DVT) prophylaxis [Z78.9]     Priority: Low     Systemic anticoagulation contraindicated secondary to elevated bleeding risk.  RAP score 10  10/29 - Truama duplex LE study negative   10/30 - Initiate pharmacological DVT prophylaxis     • HTN (hypertension) [I10]     Priority: Low     Chronic condition treated with lisinopril.  Adequate BP control off of maintenance medication.        Core Measures & Quality Metrics:  Labs reviewed, Medications reviewed and Radiology images reviewed  Mcknight catheter: Urologic Surgery or Other Surgery on Contiguous Structures of the Genitourinary Tract or Studies and Urinary Tract Retention or Urinary Tract Obstruction      DVT Prophylaxis: Enoxaparin (Lovenox)    Ulcer prophylaxis: Not indicated    Assessed for rehab: Patient was assess for and/or received rehabilitation services during this hospitalization    Total Score: 10     ETOH Screening     Intervention complete date: 11/2/2017  Patient response to intervention: does not drink daily.     Discussed patient condition with RN, Patient and general surgery.

## 2017-11-06 NOTE — PROGRESS NOTES
Assisted patient to bed from wheelchair, slide board used, 2 person assist. Patient now resting in bed with friend at bedside, call light is within reach, bed is locked and low. Will continue to monitor.

## 2017-11-07 LAB
GLUCOSE BLD-MCNC: 114 MG/DL (ref 65–99)
GLUCOSE BLD-MCNC: 121 MG/DL (ref 65–99)
GLUCOSE BLD-MCNC: 130 MG/DL (ref 65–99)
GLUCOSE BLD-MCNC: 135 MG/DL (ref 65–99)

## 2017-11-07 PROCEDURE — A9270 NON-COVERED ITEM OR SERVICE: HCPCS | Performed by: SURGERY

## 2017-11-07 PROCEDURE — 82962 GLUCOSE BLOOD TEST: CPT

## 2017-11-07 PROCEDURE — A9270 NON-COVERED ITEM OR SERVICE: HCPCS | Performed by: NURSE PRACTITIONER

## 2017-11-07 PROCEDURE — 770001 HCHG ROOM/CARE - MED/SURG/GYN PRIV*

## 2017-11-07 PROCEDURE — 700102 HCHG RX REV CODE 250 W/ 637 OVERRIDE(OP): Performed by: NURSE PRACTITIONER

## 2017-11-07 PROCEDURE — 700111 HCHG RX REV CODE 636 W/ 250 OVERRIDE (IP): Performed by: SURGERY

## 2017-11-07 PROCEDURE — 700102 HCHG RX REV CODE 250 W/ 637 OVERRIDE(OP): Performed by: SURGERY

## 2017-11-07 PROCEDURE — 700112 HCHG RX REV CODE 229: Performed by: SURGERY

## 2017-11-07 RX ORDER — METAXALONE 800 MG/1
800 TABLET ORAL 3 TIMES DAILY
Qty: 90 TAB
Start: 2017-11-07

## 2017-11-07 RX ORDER — ONDANSETRON 2 MG/ML
4 INJECTION INTRAMUSCULAR; INTRAVENOUS EVERY 4 HOURS PRN
Qty: 15 ML
Start: 2017-11-07

## 2017-11-07 RX ORDER — BISACODYL 10 MG
10 SUPPOSITORY, RECTAL RECTAL
Refills: 0
Start: 2017-11-07

## 2017-11-07 RX ORDER — GABAPENTIN 100 MG/1
100 CAPSULE ORAL 3 TIMES DAILY
Qty: 90 CAP
Start: 2017-11-07

## 2017-11-07 RX ORDER — OXYCODONE HYDROCHLORIDE 5 MG/1
5 TABLET ORAL
Qty: 30 TAB | Refills: 0
Start: 2017-11-07

## 2017-11-07 RX ADMIN — LEVOTHYROXINE SODIUM 75 MCG: 75 TABLET ORAL at 05:44

## 2017-11-07 RX ADMIN — METAXALONE 800 MG: 800 TABLET ORAL at 16:03

## 2017-11-07 RX ADMIN — OXYCODONE HYDROCHLORIDE 10 MG: 10 TABLET ORAL at 11:34

## 2017-11-07 RX ADMIN — DOCUSATE SODIUM 100 MG: 100 CAPSULE ORAL at 08:25

## 2017-11-07 RX ADMIN — DIBASIC SODIUM PHOSPHATE, MONOBASIC POTASSIUM PHOSPHATE AND MONOBASIC SODIUM PHOSPHATE 2 TABLET: 852; 155; 130 TABLET ORAL at 08:25

## 2017-11-07 RX ADMIN — OXYCODONE HYDROCHLORIDE 10 MG: 10 TABLET ORAL at 16:04

## 2017-11-07 RX ADMIN — POLYETHYLENE GLYCOL 3350 1 PACKET: 17 POWDER, FOR SOLUTION ORAL at 21:35

## 2017-11-07 RX ADMIN — GABAPENTIN 100 MG: 100 CAPSULE ORAL at 08:25

## 2017-11-07 RX ADMIN — ENOXAPARIN SODIUM 30 MG: 100 INJECTION SUBCUTANEOUS at 08:25

## 2017-11-07 RX ADMIN — GABAPENTIN 100 MG: 100 CAPSULE ORAL at 16:03

## 2017-11-07 RX ADMIN — METAXALONE 800 MG: 800 TABLET ORAL at 21:35

## 2017-11-07 RX ADMIN — ENOXAPARIN SODIUM 30 MG: 100 INJECTION SUBCUTANEOUS at 21:34

## 2017-11-07 RX ADMIN — DIBASIC SODIUM PHOSPHATE, MONOBASIC POTASSIUM PHOSPHATE AND MONOBASIC SODIUM PHOSPHATE 2 TABLET: 852; 155; 130 TABLET ORAL at 21:35

## 2017-11-07 RX ADMIN — GABAPENTIN 100 MG: 100 CAPSULE ORAL at 21:35

## 2017-11-07 RX ADMIN — DOCUSATE SODIUM 100 MG: 100 CAPSULE ORAL at 21:35

## 2017-11-07 RX ADMIN — SIMVASTATIN 20 MG: 10 TABLET, FILM COATED ORAL at 21:35

## 2017-11-07 RX ADMIN — METAXALONE 800 MG: 800 TABLET ORAL at 08:25

## 2017-11-07 RX ADMIN — OXYCODONE HYDROCHLORIDE 10 MG: 10 TABLET ORAL at 21:35

## 2017-11-07 RX ADMIN — METFORMIN HYDROCHLORIDE 500 MG: 500 TABLET, EXTENDED RELEASE ORAL at 08:25

## 2017-11-07 RX ADMIN — DIBASIC SODIUM PHOSPHATE, MONOBASIC POTASSIUM PHOSPHATE AND MONOBASIC SODIUM PHOSPHATE 2 TABLET: 852; 155; 130 TABLET ORAL at 16:03

## 2017-11-07 RX ADMIN — STANDARDIZED SENNA CONCENTRATE AND DOCUSATE SODIUM 1 TABLET: 8.6; 5 TABLET, FILM COATED ORAL at 21:35

## 2017-11-07 RX ADMIN — OXYCODONE HYDROCHLORIDE 10 MG: 10 TABLET ORAL at 05:45

## 2017-11-07 RX ADMIN — METFORMIN HYDROCHLORIDE 500 MG: 500 TABLET, EXTENDED RELEASE ORAL at 18:00

## 2017-11-07 ASSESSMENT — ENCOUNTER SYMPTOMS
SHORTNESS OF BREATH: 0
HEADACHES: 0
ABDOMINAL PAIN: 1
PSYCHIATRIC NEGATIVE: 1
NEUROLOGICAL NEGATIVE: 1
FEVER: 0
NAUSEA: 0
VOMITING: 0
COUGH: 0
CHILLS: 0

## 2017-11-07 ASSESSMENT — PAIN SCALES - GENERAL
PAINLEVEL_OUTOF10: 0
PAINLEVEL_OUTOF10: 3
PAINLEVEL_OUTOF10: 8
PAINLEVEL_OUTOF10: 7
PAINLEVEL_OUTOF10: 4
PAINLEVEL_OUTOF10: 0

## 2017-11-07 NOTE — PROGRESS NOTES
Received report from NOC RN. Assumed care at 0715. PT AOx4, up with 2-3 person assist to WC. Voiding via Bed pan, pt has ace wrap to LLE and Immobilizer to RLE. Large hip incision to RLE noted and CDI with gauze and tegaderm. MLI NANETTE, Pain controlled with Oxy. VSS on room air.  Plan to DC to [facility] when bed available.

## 2017-11-07 NOTE — DISCHARGE PLANNING
SW met with pt bedside. SW used  line for [place]. SW explained FMLA paperwork again to pt and that her sister had signed her portion and pt needed to sign other portion for her sister to get FMLA approved. Pt reported she was fearful of signing due to fear that we are kicking her out of the hospital . Pt reported being paralyzed from waist down and was concerned they were not getting proper care if leaving [facility]. This SW explained to pt that they are transferring to [facility] and will be going tomorrow by [facility]. Pt reported understanding. PT signed [place] paperwork. This SW gave paperwork to PILY Dorsey.

## 2017-11-07 NOTE — PROGRESS NOTES
Patient is A&Ox4.   Reports pain to RLE, medicated per MAR   Immobilizer in place to RLE. ACE wrap around LLE. Patient remains TTWB to BLE. Requires x 2 assist to reposition. Denies numbness and tingling.   On RA, denies SOB, chest pain.   Normoactive BS x 4. Tolerating diet. Denies nausea/vomiting. + flatus, LBM 11/5 per pt. Pt is voiding q shift.   Dressing to BLE c/d/i.   Midline abdominal incision with steristips, approximated   PIV SL. Flushed with Saline, patent.   Updated on plan of care. Belongings and call light within reach. All needs met at this time.

## 2017-11-07 NOTE — DISCHARGE PLANNING
Referral: SNF    Intervention: PILY spoke with EDUAR Strickland.  PILY requested an update on bed availability at [facility].    Plan: Await update from [facility].

## 2017-11-07 NOTE — CARE PLAN
Problem: Pain Management  Goal: Pain level will decrease to patient's comfort goal  Outcome: PROGRESSING AS EXPECTED  Pain managed with Oxy 10 mg PRN

## 2017-11-07 NOTE — PROGRESS NOTES
"  Trauma/Surgical Progress Note    Author: Trang Moreno Date & Time created: 11/7/2017   11:53 AM     Interval Events:  [facility]  Awaiting bed availability  Plan for transport on 11/7  Pt and family updated      Review of Systems   Constitutional: Negative for chills and fever.   HENT: Negative for hearing loss.    Respiratory: Negative for cough and shortness of breath.    Cardiovascular: Negative for chest pain and leg swelling.   Gastrointestinal: Positive for abdominal pain. Negative for nausea and vomiting.        Last bowel movement 11/5  Decreased appetite   Genitourinary:        Voiding   Musculoskeletal: Positive for joint pain.        Extremity pain, right leg when moving   Skin: Negative for rash.   Neurological: Negative.  Negative for headaches.   Psychiatric/Behavioral: Negative.    All other systems reviewed and are negative.    Hemodynamics:  Blood pressure 103/59, pulse 84, temperature 36.5 °C (97.7 °F), resp. rate 18, height 1.499 m (4' 11\"), weight 76.7 kg (169 lb 1.5 oz), SpO2 96 %, not currently breastfeeding.     Respiratory:    Respiration: 18, Pulse Oximetry: 96 %        RUL Breath Sounds: Clear, RML Breath Sounds: Clear, RLL Breath Sounds: Diminished, KEYUR Breath Sounds: Clear, LLL Breath Sounds: Diminished  Fluids:    Intake/Output Summary (Last 24 hours) at 11/07/17 1153  Last data filed at 11/07/17 0830   Gross per 24 hour   Intake              980 ml   Output                0 ml   Net              980 ml     Admit Weight: 68.1 kg (150 lb 2.1 oz)  Current      Physical Exam    Medical Decision Making/Problem List:    Active Hospital Problems    Diagnosis   • Closed nondisplaced fracture of lateral malleolus of left fibula [S82.65XA]     Priority: Medium     Minimally displaced  Small adjacent puncture wound.  10/26 - ORIF  Weight bearing status - TTWB.  Emory Bajwa MD. Orthopedic Surgery     • Type 2 diabetes mellitus (CMS-HCC) [E11.9]     Priority: Medium     " Chronic condition treated with metformin 1000 mg twice a day..  10/29 - HbA1C - 6.0 (126)  10/30 - DC Lantus. Resume maintenance medication at 500 mg twice a day. Continue High ISS and serial BS..  Adequate glycemic control       • Closed fracture of right femur (CMS-[facility]) [S72.91XA]     Priority: Medium      Comminuted fractures of the proximal and distal femur, right .  10/26 - ORIF  Weight bearing status - TTWB BLE.  Emory Bajwa MD. Orthopedic surgery     • Multiple fractures of pelvis (CMS-[facility]) [S32.810A]     Priority: Medium     Bilateral superior and inferior pubic rami fractures. Displaced on the left.   There is likely fracture of at least the right sacral alar.   Possible fracture of the right posterior acetabulum.  10/26 - Percutaneous screw fixation  Weight bearing status - TTWB BLE.      Emory Bajwa MD. Orthopedic surgery     • Hypothyroid [E03.9]     Priority: Low     Chronic condition treated with synthroid.  Resume maintenance medication.       • Hyperlipidemia [E78.5]     Priority: Low     Chronic condition treated with simvastatin.  Resume maintenance medication.       • No contraindication to deep vein thrombosis (DVT) prophylaxis [Z78.9]     Priority: Low     Systemic anticoagulation contraindicated secondary to elevated bleeding risk.  RAP score 10  10/29 - Truama duplex LE study negative   10/30 - Initiate pharmacological DVT prophylaxis     • HTN (hypertension) [I10]     Priority: Low     Chronic condition treated with lisinopril.  Adequate BP control off of maintenance medication.      • Spleen laceration [S36.039A]     Priority: Low     Small grade 1 laceration, lateral inferior aspect of the spleen.  Trend abdominal exam and laboratory studies   11/5 staple removal from abdominal wound     • Traumatic rupture of bladder [S37.29XA]     Priority: Low     Intra-peritoneal rupture  10/25 - Layered closure  Mcknight to remain in place for 10 days  11/4 cystogram with no leak noted  11/5 mcknight  removal trial  11/6 voiding without difficulty   Trino Tsang MD. Trauma surgery        Core Measures & Quality Metrics:  Labs reviewed, Medications reviewed and Radiology images reviewed        DVT Prophylaxis: Enoxaparin (Lovenox)    Ulcer prophylaxis: Not indicated    Assessed for rehab: Patient was assess for and/or received rehabilitation services during this hospitalization    Total Score: 10     ETOH Screening     Intervention complete date: 11/2/2017  Patient response to intervention: does not drink daily.       Discussed patient condition with RN, Patient and trauma surgery. Trinh Toribio

## 2017-11-07 NOTE — DISCHARGE PLANNING
Referral: SNF    Intervention: Per CCS, [facility] has confirmed bed availability for tomorrow Wednesday November 11th.  RN indicates pt is max assist with transfers and NWB in both lower extremities.      RN discuss pt's level of functioning with RN at [facility] and it was established that pt requires [facility].      Per [facility], [facility] from [facility] to [facility] is $732.00.    PILY emailed the Patient Assistance Fund Request to the Management Team, approval received.  PILY faxed the Approved Services Form to [facility].    Per [facility]CS, [facility] transportation is scheduled for tomorrow at 10:00am, notified RN Andrey via phone.    Plan: As Above.  SNF tomorrow.

## 2017-11-07 NOTE — DISCHARGE PLANNING
SW left VM for Jennifer in regards to [facility] and pt transferring to [facility] with bed availability.

## 2017-11-07 NOTE — DISCHARGE PLANNING
SW met with pt bedside. SW used phone on speaker and translation services. SW reported to pt that [facility] paperwork for pt sister was received. Pt reported they did not want to sign papers until sister was there. SW left papers for pt to sign first page and sister to sign first page and this SW will fax over to [facility] for Dr. Tsang. SW to  signed [facility] paperwork in the morning.

## 2017-11-07 NOTE — CARE PLAN
Problem: Venous Thromboembolism (VTW)/Deep Vein Thrombosis (DVT) Prevention:  Goal: Patient will participate in Venous Thrombosis (VTE)/Deep Vein Thrombosis (DVT)Prevention Measures  Outcome: PROGRESSING AS EXPECTED  Refused SCDs at this time, aware of risks following education, continues to refuse. Lovenox administered per MAR

## 2017-11-07 NOTE — CARE PLAN
Problem: Safety  Goal: Will remain free from injury    Intervention: Provide assistance with mobility  Pt up to bathroom with OT, bed in lowest locked position, call light within reach, and personal items are available.       Problem: Knowledge Deficit  Goal: Knowledge of disease process/condition, treatment plan, diagnostic tests, and medications will improve    Intervention: Assess knowledge level of disease process/condition, treatment plan, diagnostic tests, and medications  Pt updated on POC

## 2017-11-08 ENCOUNTER — APPOINTMENT (OUTPATIENT)
Dept: RADIOLOGY | Facility: MEDICAL CENTER | Age: 52
DRG: 956 | End: 2017-11-08
Attending: PHYSICIAN ASSISTANT
Payer: COMMERCIAL

## 2017-11-08 VITALS
BODY MASS INDEX: 34.09 KG/M2 | TEMPERATURE: 97.9 F | OXYGEN SATURATION: 92 % | HEART RATE: 78 BPM | RESPIRATION RATE: 16 BRPM | WEIGHT: 169.09 LBS | DIASTOLIC BLOOD PRESSURE: 52 MMHG | HEIGHT: 59 IN | SYSTOLIC BLOOD PRESSURE: 105 MMHG

## 2017-11-08 LAB
GLUCOSE BLD-MCNC: 117 MG/DL (ref 65–99)
GLUCOSE BLD-MCNC: 138 MG/DL (ref 65–99)

## 2017-11-08 PROCEDURE — 700102 HCHG RX REV CODE 250 W/ 637 OVERRIDE(OP): Performed by: NURSE PRACTITIONER

## 2017-11-08 PROCEDURE — 700102 HCHG RX REV CODE 250 W/ 637 OVERRIDE(OP): Performed by: SURGERY

## 2017-11-08 PROCEDURE — A9270 NON-COVERED ITEM OR SERVICE: HCPCS | Performed by: NURSE PRACTITIONER

## 2017-11-08 PROCEDURE — 700112 HCHG RX REV CODE 229: Performed by: SURGERY

## 2017-11-08 PROCEDURE — 73610 X-RAY EXAM OF ANKLE: CPT | Mod: RT

## 2017-11-08 PROCEDURE — 700111 HCHG RX REV CODE 636 W/ 250 OVERRIDE (IP): Performed by: SURGERY

## 2017-11-08 PROCEDURE — A9270 NON-COVERED ITEM OR SERVICE: HCPCS | Performed by: SURGERY

## 2017-11-08 PROCEDURE — 82962 GLUCOSE BLOOD TEST: CPT

## 2017-11-08 RX ADMIN — METFORMIN HYDROCHLORIDE 500 MG: 500 TABLET, EXTENDED RELEASE ORAL at 07:43

## 2017-11-08 RX ADMIN — ENOXAPARIN SODIUM 30 MG: 100 INJECTION SUBCUTANEOUS at 07:43

## 2017-11-08 RX ADMIN — OXYCODONE HYDROCHLORIDE 10 MG: 10 TABLET ORAL at 11:30

## 2017-11-08 RX ADMIN — DOCUSATE SODIUM 100 MG: 100 CAPSULE ORAL at 07:42

## 2017-11-08 RX ADMIN — METAXALONE 800 MG: 800 TABLET ORAL at 07:43

## 2017-11-08 RX ADMIN — POLYETHYLENE GLYCOL 3350 1 PACKET: 17 POWDER, FOR SOLUTION ORAL at 07:43

## 2017-11-08 RX ADMIN — OXYCODONE HYDROCHLORIDE 10 MG: 10 TABLET ORAL at 05:57

## 2017-11-08 RX ADMIN — GABAPENTIN 100 MG: 100 CAPSULE ORAL at 07:42

## 2017-11-08 RX ADMIN — LEVOTHYROXINE SODIUM 75 MCG: 75 TABLET ORAL at 05:57

## 2017-11-08 RX ADMIN — MAGNESIUM HYDROXIDE 30 ML: 400 SUSPENSION ORAL at 07:43

## 2017-11-08 RX ADMIN — OXYCODONE HYDROCHLORIDE 10 MG: 10 TABLET ORAL at 02:53

## 2017-11-08 ASSESSMENT — PAIN SCALES - GENERAL
PAINLEVEL_OUTOF10: 4
PAINLEVEL_OUTOF10: 9

## 2017-11-08 NOTE — PROGRESS NOTES
Patient is A&Ox4.   Reports pain to RLE, medicated per MAR   Immobilizer in place to RLE. ACE wrap around LLE. Patient remains TTWB to BLE. Requires x 2 assist to reposition. Denies numbness and tingling.   On RA, denies SOB, chest pain.   Normoactive BS x 4. Tolerating diet. Denies nausea/vomiting. + flatus, LBM 11/7 per pt. Pt is voiding q shift.   Dressing to BLE c/d/i.   Midline abdominal incision with steristips, approximated   PIV SL. Flushed with Saline, patent.   Updated on plan of care. Belongings and call light within reach. All needs met at this time.

## 2017-11-08 NOTE — PROGRESS NOTES
"/52   Pulse 82   Temp 37.1 °C (98.7 °F)   Resp 17   Ht 1.499 m (4' 11\")   Wt 76.7 kg (169 lb 1.5 oz)   SpO2 90%   Breastfeeding? No   BMI 34.15 kg/m²      Discharge summary dictated   Confirmation #[id number]  "

## 2017-11-08 NOTE — PROGRESS NOTES
Pt transferred to [facility] via [facility]. PIV removed, DC Instructions discussed in detail. Family has address and number of [facility]. All questions answered and needs met.
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
## 2017-11-08 NOTE — DISCHARGE PLANNING
Transport time changed to 1430 via [facility]. Andreia(RSSUSAN) notified of transport time change via voicemail. Sunita(PILY) notified via phone.

## 2017-11-08 NOTE — DISCHARGE INSTRUCTIONS
Discharge Instructions    Discharged to [facility] by medical transportation with escort. Discharged via ambulance, hospital escort: Yes.  Special equipment needed: Immobilizer    Be sure to schedule a follow-up appointment with your primary care doctor or any specialists as instructed.     Discharge Plan:   Diet Plan: Discussed  Confirmed Follow up Appointment: Patient to Call and Schedule Appointment  Confirmed Symptoms Management: Discussed  Medication Reconciliation Updated: Yes  Pneumococcal Vaccine Given - only chart on this line when given: Given (See MAR)  Influenza Vaccine Indication: Indicated: 9 to 64 years of age  Influenza Vaccine Given - only chart on this line when given: Influenza Vaccine Given (See MAR)    I understand that a diet low in cholesterol, fat, and sodium is recommended for good health. Unless I have been given specific instructions below for another diet, I accept this instruction as my diet prescription.   Other diet: Diabetic Diet    Special Instructions: None    · Is patient discharged on Warfarin / Coumadin?   No     · Is patient Post Blood Transfusion?  No    Depression / Suicide Risk    As you are discharged from this [facility] facility, it is important to learn how to keep safe from harming yourself.    Recognize the warning signs:  · Abrupt changes in personality, positive or negative- including increase in energy   · Giving away possessions  · Change in eating patterns- significant weight changes-  positive or negative  · Change in sleeping patterns- unable to sleep or sleeping all the time   · Unwillingness or inability to communicate  · Depression  · Unusual sadness, discouragement and loneliness  · Talk of wanting to die  · Neglect of personal appearance   · Rebelliousness- reckless behavior  · Withdrawal from people/activities they love  · Confusion- inability to concentrate     If you or a loved one observes any of these behaviors or has concerns about self-harm,  here's what you can do:  · Talk about it- your feelings and reasons for harming yourself  · Remove any means that you might use to hurt yourself (examples: pills, rope, extension cords, firearm)  · Get professional help from the community (Mental Health, Substance Abuse, psychological counseling)  · Do not be alone:Call your Safe Contact- someone whom you trust who will be there for you.  · Call your local CRISIS HOTLINE 009-0003 or 116-729-1659  · Call your local Children's Mobile Crisis Response Team [place] (139) 283-7498 or www[employer]  · Call the toll free National Suicide Prevention Hotlines   · National Suicide Prevention Lifeline 231-651-EFLV (7445)  · [employer] 800-SUICIDE (016-7729)    Pelvic Fracture  Pelvic fractures are usually due to a fall or car accident. Minor fractures of the pelvic bones can often be treated at home. Walking or changing positions may be painful. You should rest for several days but then begin weight bearing and walking as tolerated. Crutches or a walker are often used in the first few weeks to reduce pain and enable you to get around easier. Prolonged bed rest for a pelvic fracture is not recommended. It increases your risk for blood clots and other complications.  Pelvic fractures usually heal in 6-12 weeks without any special treatment. Treatment may include pain medicine, medicine to keep your stool soft, and crutches or a walker.   Take these simple measures to avoid further falls and injuries:  · Get rid of your throw rugs and electrical cords from traveled areas.   · Avoid poorly lit stairs.   · Do not wear high heel shoes.   If you are at risk for osteoporosis, treatment includes regular exercise, a diet rich in calcium and vitamin D, and possibly other drugs to help preserve bone. Ask your primary care physician if you should have a bone density test (DEXA scan) if you are 50 years or older.  SEEK IMMEDIATE MEDICAL CARE IF:  You develop  blood in your urine, increased pain, severe constipation, increasing difficulty walking, pain or unusual swelling in your legs, chest pain, fever, shortness of breath, or if you faint or fall.   MAKE SURE YOU:   · Understand these instructions.   · Will watch your condition.   · Will get help right away if you are not doing well or get worse.       Femoral Shaft Fracture  A femoral shaft fracture is a break (fracture) in the shaft of the thigh bone (femur). The femur is the long bone that connects the hip joint to the knee joint. Most femoral shaft fractures are closed fractures. A closed fracture is a break in a bone that happens without any cuts (lacerations) through the skin that is near the fracture site. Some femoral shaft fractures are open fractures. An open fracture is a break in a bone that happens along with lacerations through the skin that is near the fracture site.   CAUSES  A healthy femur may break from a forceful impact, such as from:  · A fall, especially from a great height.  · A high-impact sports injury.  · A car or motorcycle accident.  A weakened femur may break from minimal impact or force due to:  · Certain medical conditions.  · Age.  RISK FACTORS  This condition is more likely to develop in:  · Older people.  · People who have certain medical conditions that cause bones to become weak or thin, such as:  ¨ Osteoporosis.  ¨ Cancer.  ¨ Osteogenesis imperfecta. This is a condition that involves bone weakness that is due to abnormal bone development.  · People who take certain medicines (bisphosphonates) that are used to treat osteoporosis.  · People who participate in high-risk sports or impact sports.  SYMPTOMS  Symptoms of this condition include:  · Severe pain.  · Inability to walk.  · Bruising.  · Swelling or visible deformity of the leg.  · Substantial bleeding, if it is an open fracture.  DIAGNOSIS  This condition is diagnosed based on your symptoms and a physical exam. You may also have  other tests, including:  · X-rays of the femur. Since the force required to break a healthy femur can break other bones, X-rays are often taken of the hip, knee, and pelvis as well.  · Evaluation of the blood vessels with specialized X-rays (arteriogram).  · Evaluation of the nerves in the area of the break.  · CT scan or MRI.  TREATMENT  A femoral shaft fracture usually requires surgery. You may have one or more of the following surgical treatments:  · External fixation. This involves using pins and screws to hold the bones in place if there is extensive soft tissue injury. After some time, you may need an additional surgical treatment, such as intramedullary nailing.  · Intramedullary nailing. This involves inserting a demetrius (intramedullary nail) through an incision. The intramedullary nail goes down the center of the shaft of the femur. It may be inserted in the knee joint or from the top of the femur near the hip. Generally, screws are placed through the demetrius at both ends to prevent shortening or rotation of the femur as it heals.  · Plates to stabilize the fracture. These may be used, especially when the fracture is at either end of the bone, near the hip or the knee.  In rare cases for which surgery is not an option, a cast or a splint may be used to hold (immobilize) the bone while it heals.  PREVENTION  If factors such as certain medical conditions or age increase your risk for another femoral shaft fracture, you may be able to prevent one if you follow these instructions:  · Use aids for walking, such as a walker or cane, as directed by your health care provider.  · Follow instructions from your health care provider about how to strengthen your bones if you have osteoporosis.     This information is not intended to replace advice given to you by your health care provider. Make sure you discuss any questions you have with your health care provider.       Splenic Injury  A splenic injury is an injury of the  spleen. The spleen is an organ located in the upper left area of your abdomen, just under your ribs. Your spleen filters and cleans your blood. It also stores blood cells and destroys cells that are worn out. Your spleen is also important for fighting disease.   Splenic injuries can vary. In some cases, the spleen may only be bruised with some bleeding inside the covering and around the spleen. Splenic injuries may also cause a deep tear or cut into the spleen (lacerated spleen). Some splenic injuries can cause the spleen to break open (rupture).  CAUSES   Splenic injuries can be caused by a direct blow (blunt trauma) from:  · Car accidents.  · Contact sports.  · Falls.  Gunshot wounds or knife wounds (penetrating injuries) can also cause a splenic injury.  RISK FACTORS  You may be at greater risk for a splenic injury if you have a disease that can cause the spleen to become enlarged. These include:  · Alcoholic liver disease.  · Viral infections, especially mononucleosis.  SIGNS AND SYMPTOMS   A minor splenic injury often causes no symptoms or only minor abdominal pain. If the injury causes severe bleeding, your blood pressure may rapidly decrease. This may cause:  · Dizziness or light-headedness.  · Rapid heart rate.  · Difficulty breathing.  · Fainting.  · Sweating with clammy skin.  Other signs and symptoms of a splenic injury can include:  · Very bad abdominal pain.  · Pain in the left shoulder.  · Pain when the abdomen is pressed (tenderness).  · Nausea.  · Swelling or bruising of the abdomen.  DIAGNOSIS   Your health care provider may suspect a splenic injury based on your signs and symptoms, especially if you were recently in an accident or you recently got hurt. Your health care provider will do a physical exam. Imaging tests may be done to confirm the diagnosis. These may include:  · Ultrasound.  · CT scan.  You may have frequent blood tests for a few days after the injury to monitor your condition.    TREATMENT   Treatment depends on the type of splenic injury you have and how bad it is. Your health care provider will develop a treatment plan specific to your needs.  · Less severe injuries may be treated with:  ¨ Observation.  ¨ Interventional radiology. This involves using flexible tubes (catheters) to stop the bleeding from inside the blood vessel.    · More severe injuries may require hospitalization in the intensive care unit (ICU). While you are in the ICU:    ¨ Your fluid and blood levels will be monitored closely.  ¨ You will get fluids through an IV tube as needed.    ¨ You may need follow-up scans to check whether your spleen is able to heal itself. If the injury is getting worse, you may need surgery.  ¨ You may receive donated blood (transfusion).  ¨ You may have a long needle inserted into your abdomen to remove any blood that has collected inside the spleen (hematoma).  · Surgery. If your blood pressure is too low, you may need emergency surgery. This may include:  ¨ Repairing a laceration.  ¨ Removing part of the spleen.  ¨ Removing the entire spleen (splenectomy).  HOME CARE INSTRUCTIONS  · Take medicines only as directed by your health care provider.  · Rest at home.  · Do not participate in any strenuous activity until your health care provider says it is safe to do so.  · Do not lift anything that is heavier than 10 lb (4.5 kg).  · Do not participate in contact sports until your health care provider says it is safe to do so.  SEEK MEDICAL CARE IF:  · You have a fever.  · You have new or increasing pain in your abdomen or in your left shoulder.  SEEK IMMEDIATE MEDICAL CARE IF:  · You have signs or symptoms of internal bleeding. Watch for:  ¨ Sweating.  ¨ Dizziness.  ¨ Weakness.  ¨ Cold and clammy skin.  ¨ Fainting.  · You have chest pain or difficulty breathing.      This information is not intended to replace advice given to you by your health care provider. Make sure you discuss any questions  you have with your health care provider.

## 2017-11-08 NOTE — DISCHARGE PLANNING
Received PCS and Approved Services form from Sunita(PILY). Arranged patient's transport to [facility] tomorrow, 11/8, at 1000 via [facility]. Sunita(PILY) and Andreia(BE) notified of transport time via phone. Approved Services Form faxed to [facility].

## 2017-11-08 NOTE — PROGRESS NOTES
Cora from [facility] called for report. Report given, all questions answered. Pt will be transferred to [facility] via [facility] at 1430.

## 2017-11-08 NOTE — PROGRESS NOTES
Assumed care of pt at 0715. Pt is AOX4, calm and cooperative. Pt denies pain, giving a rating of 3/10, denies SOB, chest pain, numbness/tingling. Pt has a ACE wrap to LLE and an immobilizer to RLE. Pt last BM was on 11/7/2017. Pt is resting comfortably in bed, with all needs met at this time. Call light is within reach, upper side rails are up, treaded socks are on, hourly rounding in place.

## 2017-11-08 NOTE — PROGRESS NOTES
"   Orthopaedic PA Progress Note    Interval changes:C/O R Ankle pain, XR orderedd    ROS - Patient denies any new issues. No chest pain, dyspnea, or fever.  Pain well controlled.    Blood pressure 100/52, pulse 82, temperature 37.1 °C (98.7 °F), resp. rate 17, height 1.499 m (4' 11\"), weight 76.7 kg (169 lb 1.5 oz), SpO2 90 %, not currently breastfeeding.    Patient seen and examined  No acute distress  Breathing non labored  RRR  Surgical dressings clean, dry, and intact. Patient clearly fires tibialis anterior, EHL, and gastrocnemius/soleus. Sensation is intact to light touch throughout superficial peroneal, deep peroneal, tibial, saphenous, and sural nerve distributions. Strong and palpable 2+ dorsalis pedis and posterior tibial pulses with capillary refill less than 2 seconds. No lower leg tenderness or discomfort.    Active Hospital Problems    Diagnosis   • Closed nondisplaced fracture of lateral malleolus of left fibula [S82.65XA]     Priority: Medium     Minimally displaced  Small adjacent puncture wound.  10/26 - ORIF  Weight bearing status - TTWB BLE.  Emory Bajwa MD. Orthopedic Surgery     • Type 2 diabetes mellitus (CMS-HCC) [E11.9]     Priority: Medium     Chronic condition treated with metformin 1000 mg twice a day.  10/29 - HbA1C - 6.0 (126)  10/30 - DC Lantus. Resume maintenance medication at 500 mg twice a day. Continue High ISS  Adequate glycemic control     • Closed fracture of right femur (CMS-HCC) [S72.91XA]     Priority: Medium      Comminuted fractures of the proximal and distal femur, right .  10/26 - ORIF  Weight bearing status - TTWB BLE.  Right knee ROM OK  Emory Bajwa MD. Orthopedic surgery     • Multiple fractures of pelvis (CMS-HCC) [S32.810A]     Priority: Medium     Bilateral superior and inferior pubic rami fractures. Displaced on the left.   There is likely fracture of at least the right sacral alar.   Possible fracture of the right posterior acetabulum.  10/26 - Percutaneous " screw fixation  Weight bearing status - TTWB BLE.       Emoyr Bajwa MD. Orthopedic surgery     • Hypothyroid [E03.9]     Priority: Low     Chronic condition treated with synthroid.  Resume maintenance medication.        • Hyperlipidemia [E78.5]     Priority: Low     Chronic condition treated with simvastatin.  Resume maintenance medication.        • No contraindication to deep vein thrombosis (DVT) prophylaxis [Z78.9]     Priority: Low     Systemic anticoagulation contraindicated secondary to elevated bleeding risk.  RAP score 10  10/29  - Trauma screening bilateral lower extremity venous duplex negative for above knee DVT.  10/30 - Prophylactic Lovenox initiated  Lower extremity duplex if clinically indicated      • HTN (hypertension) [I10]     Priority: Low     Chronic condition treated with lisinopril.  Adequate BP control off of maintenance medication.       • Spleen laceration [S36.039A]     Priority: Low     Small grade 1 laceration, lateral inferior aspect of the spleen.  Trend abdominal exam and laboratory studies      • Traumatic rupture of bladder [S37.29XA]     Priority: Low     Intra-peritoneal rupture  10/25 - Layered closure  Mcknight to remain in place for 10 days  11/4 cystogram with no leak noted  11/5 mcknight removal  11/6 voiding without difficulty   Tirno Tsang MD. Trauma surgery          Assessment/Plan:  POD#13 S/P ORIF L femur, R ankle   Wt bearing status - TWB BLE  PT/OT-initiated  Wound care:Sutures out tomorrrow  Drains - out  Mcknight-out  Sutures/Staples out- 10-14 days post operatively  Antibiotics: complete  DVT Prophylaxis- TEDS/SCDs/Foot pumps.   Lovenox: Started, Duration-until ambulatory > 150'  Future Procedures - XR R foot/ankle  Case Coordination for Discharge Planning - Disposition Ready for SNF from Ortho perspective if XR negative, F/U Dr Bajwa at [facility] next week

## 2017-11-08 NOTE — DISCHARGE PLANNING
Referral: FMLA    Intervention: PILY spoke with Trang Trauma PA.  Trang instructed PILY faxes FMLA Form to [facility] ([phone]), form faxed.    PILY met with pt and Keegan, pt's father at bedside.  PILY explained transfer arrangements to [facility] scheduled for tomorrow 11/8/17 at 10:00am.      PILY also explained FMLA form has been faxed to Dr. Tsang, PILY provided fax receipt to pt.  PILY updated Brenda, pt's sister via phone.    Plan: As Above.  SNF.
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
## 2017-11-09 ENCOUNTER — HOSPITAL ENCOUNTER (OUTPATIENT)
Dept: LAB | Facility: MEDICAL CENTER | Age: 52
End: 2017-11-09
Attending: INTERNAL MEDICINE
Payer: COMMERCIAL

## 2017-11-09 LAB
ANION GAP SERPL CALC-SCNC: 9 MMOL/L (ref 0–11.9)
BASOPHILS # BLD AUTO: 0.5 % (ref 0–1.8)
BASOPHILS # BLD: 0.03 K/UL (ref 0–0.12)
BUN SERPL-MCNC: 9 MG/DL (ref 8–22)
CALCIUM SERPL-MCNC: 8.6 MG/DL (ref 8.5–10.5)
CHLORIDE SERPL-SCNC: 104 MMOL/L (ref 96–112)
CO2 SERPL-SCNC: 24 MMOL/L (ref 20–33)
CREAT SERPL-MCNC: 0.28 MG/DL (ref 0.5–1.4)
EOSINOPHIL # BLD AUTO: 0.11 K/UL (ref 0–0.51)
EOSINOPHIL NFR BLD: 2 % (ref 0–6.9)
ERYTHROCYTE [DISTWIDTH] IN BLOOD BY AUTOMATED COUNT: 57.1 FL (ref 35.9–50)
GFR SERPL CREATININE-BSD FRML MDRD: >60 ML/MIN/1.73 M 2
GLUCOSE SERPL-MCNC: 124 MG/DL (ref 65–99)
HCT VFR BLD AUTO: 32.3 % (ref 37–47)
HGB BLD-MCNC: 10.5 G/DL (ref 12–16)
IMM GRANULOCYTES # BLD AUTO: 0.04 K/UL (ref 0–0.11)
IMM GRANULOCYTES NFR BLD AUTO: 0.7 % (ref 0–0.9)
LYMPHOCYTES # BLD AUTO: 1.73 K/UL (ref 1–4.8)
LYMPHOCYTES NFR BLD: 30.8 % (ref 22–41)
MCH RBC QN AUTO: 30.3 PG (ref 27–33)
MCHC RBC AUTO-ENTMCNC: 32.5 G/DL (ref 33.6–35)
MCV RBC AUTO: 93.4 FL (ref 81.4–97.8)
MONOCYTES # BLD AUTO: 0.56 K/UL (ref 0–0.85)
MONOCYTES NFR BLD AUTO: 10 % (ref 0–13.4)
NEUTROPHILS # BLD AUTO: 3.14 K/UL (ref 2–7.15)
NEUTROPHILS NFR BLD: 56 % (ref 44–72)
NRBC # BLD AUTO: 0.02 K/UL
NRBC BLD AUTO-RTO: 0.4 /100 WBC
PLATELET # BLD AUTO: 577 K/UL (ref 164–446)
PMV BLD AUTO: 8.6 FL (ref 9–12.9)
POTASSIUM SERPL-SCNC: 3.8 MMOL/L (ref 3.6–5.5)
RBC # BLD AUTO: 3.46 M/UL (ref 4.2–5.4)
SODIUM SERPL-SCNC: 137 MMOL/L (ref 135–145)
WBC # BLD AUTO: 5.6 K/UL (ref 4.8–10.8)

## 2017-11-10 NOTE — DISCHARGE PLANNING
PILY informed by [facility] Jennifer that pt was sent to [facility] w/o PASRR done. PILY left VM for Jamila [phone]. Due to holiday Jamila is off and PILY is unable to get temporary ID for pt due to pt not have a SS number. SW to f/u Monday 11/13 with Jamila.

## 2017-11-11 LAB
ANION GAP SERPL CALC-SCNC: 7 MMOL/L (ref 0–11.9)
BUN SERPL-MCNC: 8 MG/DL (ref 8–22)
CALCIUM SERPL-MCNC: 8.4 MG/DL (ref 8.5–10.5)
CHLORIDE SERPL-SCNC: 103 MMOL/L (ref 96–112)
CO2 SERPL-SCNC: 25 MMOL/L (ref 20–33)
CREAT SERPL-MCNC: 0.27 MG/DL (ref 0.5–1.4)
GFR SERPL CREATININE-BSD FRML MDRD: >60 ML/MIN/1.73 M 2
GLUCOSE SERPL-MCNC: 110 MG/DL (ref 65–99)
POTASSIUM SERPL-SCNC: 3.7 MMOL/L (ref 3.6–5.5)
SODIUM SERPL-SCNC: 135 MMOL/L (ref 135–145)

## 2017-11-23 LAB
BASOPHILS # BLD AUTO: 0.6 % (ref 0–1.8)
BASOPHILS # BLD: 0.05 K/UL (ref 0–0.12)
EOSINOPHIL # BLD AUTO: 0.07 K/UL (ref 0–0.51)
EOSINOPHIL NFR BLD: 0.8 % (ref 0–6.9)
ERYTHROCYTE [DISTWIDTH] IN BLOOD BY AUTOMATED COUNT: 54.3 FL (ref 35.9–50)
HCT VFR BLD AUTO: 35.9 % (ref 37–47)
HGB BLD-MCNC: 11.7 G/DL (ref 12–16)
IMM GRANULOCYTES # BLD AUTO: 0.03 K/UL (ref 0–0.11)
IMM GRANULOCYTES NFR BLD AUTO: 0.4 % (ref 0–0.9)
LYMPHOCYTES # BLD AUTO: 1.69 K/UL (ref 1–4.8)
LYMPHOCYTES NFR BLD: 20 % (ref 22–41)
MCH RBC QN AUTO: 29.9 PG (ref 27–33)
MCHC RBC AUTO-ENTMCNC: 32.6 G/DL (ref 33.6–35)
MCV RBC AUTO: 91.8 FL (ref 81.4–97.8)
MONOCYTES # BLD AUTO: 0.8 K/UL (ref 0–0.85)
MONOCYTES NFR BLD AUTO: 9.5 % (ref 0–13.4)
NEUTROPHILS # BLD AUTO: 5.79 K/UL (ref 2–7.15)
NEUTROPHILS NFR BLD: 68.7 % (ref 44–72)
NRBC # BLD AUTO: 0 K/UL
NRBC BLD AUTO-RTO: 0 /100 WBC
PLATELET # BLD AUTO: 274 K/UL (ref 164–446)
PMV BLD AUTO: 9 FL (ref 9–12.9)
RBC # BLD AUTO: 3.91 M/UL (ref 4.2–5.4)
WBC # BLD AUTO: 8.4 K/UL (ref 4.8–10.8)

## 2017-11-28 ENCOUNTER — APPOINTMENT (OUTPATIENT)
Dept: RADIOLOGY | Facility: IMAGING CENTER | Age: 52
End: 2017-11-28
Attending: FAMILY MEDICINE
Payer: COMMERCIAL

## 2017-11-28 DIAGNOSIS — R07.89 LEFT-SIDED CHEST WALL PAIN: ICD-10-CM

## 2017-12-01 ENCOUNTER — HOSPITAL ENCOUNTER (OUTPATIENT)
Dept: LAB | Facility: MEDICAL CENTER | Age: 52
End: 2017-12-01
Attending: INTERNAL MEDICINE
Payer: COMMERCIAL

## 2017-12-14 LAB
ANION GAP SERPL CALC-SCNC: 7 MMOL/L (ref 0–11.9)
BASOPHILS # BLD AUTO: 0.5 % (ref 0–1.8)
BASOPHILS # BLD: 0.02 K/UL (ref 0–0.12)
BUN SERPL-MCNC: 8 MG/DL (ref 8–22)
CALCIUM SERPL-MCNC: 9.2 MG/DL (ref 8.5–10.5)
CHLORIDE SERPL-SCNC: 106 MMOL/L (ref 96–112)
CO2 SERPL-SCNC: 27 MMOL/L (ref 20–33)
CREAT SERPL-MCNC: 0.37 MG/DL (ref 0.5–1.4)
EOSINOPHIL # BLD AUTO: 0.11 K/UL (ref 0–0.51)
EOSINOPHIL NFR BLD: 2.6 % (ref 0–6.9)
ERYTHROCYTE [DISTWIDTH] IN BLOOD BY AUTOMATED COUNT: 49.1 FL (ref 35.9–50)
GFR SERPL CREATININE-BSD FRML MDRD: >60 ML/MIN/1.73 M 2
GLUCOSE SERPL-MCNC: 141 MG/DL (ref 65–99)
HCT VFR BLD AUTO: 36.7 % (ref 37–47)
HGB BLD-MCNC: 11.9 G/DL (ref 12–16)
IMM GRANULOCYTES # BLD AUTO: 0.01 K/UL (ref 0–0.11)
IMM GRANULOCYTES NFR BLD AUTO: 0.2 % (ref 0–0.9)
LYMPHOCYTES # BLD AUTO: 2.26 K/UL (ref 1–4.8)
LYMPHOCYTES NFR BLD: 52.8 % (ref 22–41)
MCH RBC QN AUTO: 29.8 PG (ref 27–33)
MCHC RBC AUTO-ENTMCNC: 32.4 G/DL (ref 33.6–35)
MCV RBC AUTO: 91.8 FL (ref 81.4–97.8)
MONOCYTES # BLD AUTO: 0.36 K/UL (ref 0–0.85)
MONOCYTES NFR BLD AUTO: 8.4 % (ref 0–13.4)
NEUTROPHILS # BLD AUTO: 1.52 K/UL (ref 2–7.15)
NEUTROPHILS NFR BLD: 35.5 % (ref 44–72)
NRBC # BLD AUTO: 0 K/UL
NRBC BLD AUTO-RTO: 0 /100 WBC
PLATELET # BLD AUTO: 277 K/UL (ref 164–446)
PMV BLD AUTO: 8.8 FL (ref 9–12.9)
POTASSIUM SERPL-SCNC: 3.4 MMOL/L (ref 3.6–5.5)
RBC # BLD AUTO: 4 M/UL (ref 4.2–5.4)
SODIUM SERPL-SCNC: 140 MMOL/L (ref 135–145)
WBC # BLD AUTO: 4.3 K/UL (ref 4.8–10.8)

## 2017-12-15 LAB
ANION GAP SERPL CALC-SCNC: 9 MMOL/L (ref 0–11.9)
APPEARANCE UR: CLEAR
BACTERIA #/AREA URNS HPF: NEGATIVE /HPF
BASOPHILS # BLD AUTO: 0.6 % (ref 0–1.8)
BASOPHILS # BLD: 0.03 K/UL (ref 0–0.12)
BILIRUB UR QL STRIP.AUTO: NEGATIVE
BUN SERPL-MCNC: 8 MG/DL (ref 8–22)
CALCIUM SERPL-MCNC: 9.3 MG/DL (ref 8.5–10.5)
CHLORIDE SERPL-SCNC: 107 MMOL/L (ref 96–112)
CO2 SERPL-SCNC: 26 MMOL/L (ref 20–33)
COLOR UR: YELLOW
CREAT SERPL-MCNC: 0.38 MG/DL (ref 0.5–1.4)
CULTURE IF INDICATED INDCX: YES UA CULTURE
EOSINOPHIL # BLD AUTO: 0.14 K/UL (ref 0–0.51)
EOSINOPHIL NFR BLD: 2.9 % (ref 0–6.9)
EPI CELLS #/AREA URNS HPF: ABNORMAL /HPF
ERYTHROCYTE [DISTWIDTH] IN BLOOD BY AUTOMATED COUNT: 49.4 FL (ref 35.9–50)
GFR SERPL CREATININE-BSD FRML MDRD: >60 ML/MIN/1.73 M 2
GLUCOSE SERPL-MCNC: 163 MG/DL (ref 65–99)
GLUCOSE UR STRIP.AUTO-MCNC: NEGATIVE MG/DL
HCT VFR BLD AUTO: 37.1 % (ref 37–47)
HGB BLD-MCNC: 11.9 G/DL (ref 12–16)
HYALINE CASTS #/AREA URNS LPF: ABNORMAL /LPF
IMM GRANULOCYTES # BLD AUTO: 0.01 K/UL (ref 0–0.11)
IMM GRANULOCYTES NFR BLD AUTO: 0.2 % (ref 0–0.9)
KETONES UR STRIP.AUTO-MCNC: NEGATIVE MG/DL
LEUKOCYTE ESTERASE UR QL STRIP.AUTO: ABNORMAL
LYMPHOCYTES # BLD AUTO: 2.57 K/UL (ref 1–4.8)
LYMPHOCYTES NFR BLD: 53.1 % (ref 22–41)
MAGNESIUM SERPL-MCNC: 1.8 MG/DL (ref 1.5–2.5)
MCH RBC QN AUTO: 29.7 PG (ref 27–33)
MCHC RBC AUTO-ENTMCNC: 32.1 G/DL (ref 33.6–35)
MCV RBC AUTO: 92.5 FL (ref 81.4–97.8)
MICRO URNS: ABNORMAL
MONOCYTES # BLD AUTO: 0.41 K/UL (ref 0–0.85)
MONOCYTES NFR BLD AUTO: 8.5 % (ref 0–13.4)
NEUTROPHILS # BLD AUTO: 1.68 K/UL (ref 2–7.15)
NEUTROPHILS NFR BLD: 34.7 % (ref 44–72)
NITRITE UR QL STRIP.AUTO: NEGATIVE
NRBC # BLD AUTO: 0 K/UL
NRBC BLD AUTO-RTO: 0 /100 WBC
PH UR STRIP.AUTO: 7.5 [PH]
PHOSPHATE SERPL-MCNC: 4.4 MG/DL (ref 2.5–4.5)
PLATELET # BLD AUTO: 266 K/UL (ref 164–446)
PMV BLD AUTO: 9.1 FL (ref 9–12.9)
POTASSIUM SERPL-SCNC: 3.8 MMOL/L (ref 3.6–5.5)
PROT UR QL STRIP: NEGATIVE MG/DL
RBC # BLD AUTO: 4.01 M/UL (ref 4.2–5.4)
RBC # URNS HPF: ABNORMAL /HPF
RBC UR QL AUTO: NEGATIVE
RENAL EPI CELLS #/AREA URNS HPF: ABNORMAL /HPF
SODIUM SERPL-SCNC: 142 MMOL/L (ref 135–145)
SP GR UR STRIP.AUTO: 1.01
UROBILINOGEN UR STRIP.AUTO-MCNC: 0.2 MG/DL
WBC # BLD AUTO: 4.8 K/UL (ref 4.8–10.8)
WBC #/AREA URNS HPF: ABNORMAL /HPF

## 2017-12-17 LAB
BACTERIA UR CULT: NORMAL
SIGNIFICANT IND 70042: NORMAL
SOURCE SOURCE: NORMAL

## 2017-12-18 LAB
BASOPHILS # BLD AUTO: 0.6 % (ref 0–1.8)
BASOPHILS # BLD: 0.03 K/UL (ref 0–0.12)
EOSINOPHIL # BLD AUTO: 0.13 K/UL (ref 0–0.51)
EOSINOPHIL NFR BLD: 2.4 % (ref 0–6.9)
ERYTHROCYTE [DISTWIDTH] IN BLOOD BY AUTOMATED COUNT: 48.8 FL (ref 35.9–50)
HCT VFR BLD AUTO: 36.9 % (ref 37–47)
HGB BLD-MCNC: 12 G/DL (ref 12–16)
IMM GRANULOCYTES # BLD AUTO: 0.01 K/UL (ref 0–0.11)
IMM GRANULOCYTES NFR BLD AUTO: 0.2 % (ref 0–0.9)
LYMPHOCYTES # BLD AUTO: 2.42 K/UL (ref 1–4.8)
LYMPHOCYTES NFR BLD: 44.5 % (ref 22–41)
MCH RBC QN AUTO: 30.1 PG (ref 27–33)
MCHC RBC AUTO-ENTMCNC: 32.5 G/DL (ref 33.6–35)
MCV RBC AUTO: 92.5 FL (ref 81.4–97.8)
MONOCYTES # BLD AUTO: 0.43 K/UL (ref 0–0.85)
MONOCYTES NFR BLD AUTO: 7.9 % (ref 0–13.4)
NEUTROPHILS # BLD AUTO: 2.42 K/UL (ref 2–7.15)
NEUTROPHILS NFR BLD: 44.4 % (ref 44–72)
NRBC # BLD AUTO: 0 K/UL
NRBC BLD AUTO-RTO: 0 /100 WBC
PLATELET # BLD AUTO: 264 K/UL (ref 164–446)
PMV BLD AUTO: 9.2 FL (ref 9–12.9)
RBC # BLD AUTO: 3.99 M/UL (ref 4.2–5.4)
WBC # BLD AUTO: 5.4 K/UL (ref 4.8–10.8)

## 2017-12-19 NOTE — ADDENDUM NOTE
Encounter addended by: ADRIANA Kruger on: 12/18/2017  7:27 PM<BR>    Actions taken: Order list changed

## 2017-12-30 LAB
APPEARANCE UR: CLEAR
BACTERIA #/AREA URNS HPF: ABNORMAL /HPF
BILIRUB UR QL STRIP.AUTO: NEGATIVE
COLOR UR: YELLOW
CULTURE IF INDICATED INDCX: YES UA CULTURE
EPI CELLS #/AREA URNS HPF: NEGATIVE /HPF
GLUCOSE UR STRIP.AUTO-MCNC: NEGATIVE MG/DL
HYALINE CASTS #/AREA URNS LPF: ABNORMAL /LPF
KETONES UR STRIP.AUTO-MCNC: NEGATIVE MG/DL
LEUKOCYTE ESTERASE UR QL STRIP.AUTO: ABNORMAL
MICRO URNS: ABNORMAL
NITRITE UR QL STRIP.AUTO: NEGATIVE
PH UR STRIP.AUTO: 6.5 [PH]
PROT UR QL STRIP: NEGATIVE MG/DL
RBC # URNS HPF: ABNORMAL /HPF
RBC UR QL AUTO: NEGATIVE
SP GR UR STRIP.AUTO: 1
UROBILINOGEN UR STRIP.AUTO-MCNC: 0.2 MG/DL
WBC #/AREA URNS HPF: ABNORMAL /HPF

## 2018-01-01 ENCOUNTER — HOSPITAL ENCOUNTER (OUTPATIENT)
Dept: LAB | Facility: MEDICAL CENTER | Age: 53
End: 2018-01-01
Attending: INTERNAL MEDICINE

## 2018-01-01 LAB
BACTERIA UR CULT: ABNORMAL
BACTERIA UR CULT: ABNORMAL
SIGNIFICANT IND 70042: ABNORMAL
SITE SITE: ABNORMAL
SOURCE SOURCE: ABNORMAL

## 2018-02-01 ENCOUNTER — HOSPITAL ENCOUNTER (OUTPATIENT)
Dept: LAB | Facility: MEDICAL CENTER | Age: 53
End: 2018-02-01
Attending: INTERNAL MEDICINE
Payer: COMMERCIAL

## 2018-02-08 LAB
APPEARANCE UR: CLEAR
BILIRUB UR QL STRIP.AUTO: NEGATIVE
COLOR UR: YELLOW
CULTURE IF INDICATED INDCX: NO UA CULTURE
GLUCOSE UR STRIP.AUTO-MCNC: NEGATIVE MG/DL
KETONES UR STRIP.AUTO-MCNC: NEGATIVE MG/DL
LEUKOCYTE ESTERASE UR QL STRIP.AUTO: NEGATIVE
MICRO URNS: NORMAL
NITRITE UR QL STRIP.AUTO: NEGATIVE
PH UR STRIP.AUTO: 6.5 [PH]
PROT UR QL STRIP: NEGATIVE MG/DL
RBC UR QL AUTO: NEGATIVE
SP GR UR STRIP.AUTO: 1
UROBILINOGEN UR STRIP.AUTO-MCNC: 0.2 MG/DL

## 2018-02-09 LAB
ANION GAP SERPL CALC-SCNC: 8 MMOL/L (ref 0–11.9)
BASOPHILS # BLD AUTO: 0.5 % (ref 0–1.8)
BASOPHILS # BLD: 0.03 K/UL (ref 0–0.12)
BUN SERPL-MCNC: 8 MG/DL (ref 8–22)
CALCIUM SERPL-MCNC: 9 MG/DL (ref 8.5–10.5)
CHLORIDE SERPL-SCNC: 107 MMOL/L (ref 96–112)
CO2 SERPL-SCNC: 25 MMOL/L (ref 20–33)
CREAT SERPL-MCNC: 0.4 MG/DL (ref 0.5–1.4)
EOSINOPHIL # BLD AUTO: 0.13 K/UL (ref 0–0.51)
EOSINOPHIL NFR BLD: 2.2 % (ref 0–6.9)
ERYTHROCYTE [DISTWIDTH] IN BLOOD BY AUTOMATED COUNT: 42.2 FL (ref 35.9–50)
GLUCOSE SERPL-MCNC: 89 MG/DL (ref 65–99)
HCT VFR BLD AUTO: 40.2 % (ref 37–47)
HGB BLD-MCNC: 13.1 G/DL (ref 12–16)
IMM GRANULOCYTES # BLD AUTO: 0.02 K/UL (ref 0–0.11)
IMM GRANULOCYTES NFR BLD AUTO: 0.3 % (ref 0–0.9)
LYMPHOCYTES # BLD AUTO: 2.76 K/UL (ref 1–4.8)
LYMPHOCYTES NFR BLD: 45.7 % (ref 22–41)
MCH RBC QN AUTO: 28.9 PG (ref 27–33)
MCHC RBC AUTO-ENTMCNC: 32.6 G/DL (ref 33.6–35)
MCV RBC AUTO: 88.7 FL (ref 81.4–97.8)
MONOCYTES # BLD AUTO: 0.47 K/UL (ref 0–0.85)
MONOCYTES NFR BLD AUTO: 7.8 % (ref 0–13.4)
NEUTROPHILS # BLD AUTO: 2.63 K/UL (ref 2–7.15)
NEUTROPHILS NFR BLD: 43.5 % (ref 44–72)
NRBC # BLD AUTO: 0 K/UL
NRBC BLD-RTO: 0 /100 WBC
PLATELET # BLD AUTO: 278 K/UL (ref 164–446)
PMV BLD AUTO: 8.7 FL (ref 9–12.9)
POTASSIUM SERPL-SCNC: 3.6 MMOL/L (ref 3.6–5.5)
RBC # BLD AUTO: 4.53 M/UL (ref 4.2–5.4)
SODIUM SERPL-SCNC: 140 MMOL/L (ref 135–145)
WBC # BLD AUTO: 6 K/UL (ref 4.8–10.8)

## 2018-02-10 ENCOUNTER — PATIENT OUTREACH (OUTPATIENT)
Dept: HEALTH INFORMATION MANAGEMENT | Facility: OTHER | Age: 53
End: 2018-02-10
